# Patient Record
Sex: FEMALE | Race: WHITE | NOT HISPANIC OR LATINO
[De-identification: names, ages, dates, MRNs, and addresses within clinical notes are randomized per-mention and may not be internally consistent; named-entity substitution may affect disease eponyms.]

---

## 2018-05-09 ENCOUNTER — RESULT REVIEW (OUTPATIENT)
Age: 73
End: 2018-05-09

## 2019-09-26 ENCOUNTER — APPOINTMENT (OUTPATIENT)
Dept: PSYCHIATRY | Facility: CLINIC | Age: 74
End: 2019-09-26
Payer: MEDICARE

## 2019-09-26 DIAGNOSIS — M19.90 UNSPECIFIED OSTEOARTHRITIS, UNSPECIFIED SITE: ICD-10-CM

## 2019-09-26 DIAGNOSIS — J45.909 UNSPECIFIED ASTHMA, UNCOMPLICATED: ICD-10-CM

## 2019-09-26 DIAGNOSIS — M26.609 UNSPECIFIED TEMPOROMANDIBULAR JOINT DISORDER: ICD-10-CM

## 2019-09-26 DIAGNOSIS — H91.90 UNSPECIFIED HEARING LOSS, UNSPECIFIED EAR: ICD-10-CM

## 2019-09-26 DIAGNOSIS — M79.7 FIBROMYALGIA: ICD-10-CM

## 2019-09-26 PROCEDURE — 90792 PSYCH DIAG EVAL W/MED SRVCS: CPT

## 2019-09-26 RX ORDER — NAPROXEN 375 MG/1
375 TABLET ORAL
Refills: 0 | Status: ACTIVE | COMMUNITY
Start: 2019-09-26

## 2019-10-01 ENCOUNTER — APPOINTMENT (OUTPATIENT)
Dept: PSYCHIATRY | Facility: CLINIC | Age: 74
End: 2019-10-01
Payer: MEDICARE

## 2019-10-01 PROCEDURE — 99215 OFFICE O/P EST HI 40 MIN: CPT

## 2019-10-07 ENCOUNTER — RX RENEWAL (OUTPATIENT)
Age: 74
End: 2019-10-07

## 2019-10-07 ENCOUNTER — OTHER (OUTPATIENT)
Age: 74
End: 2019-10-07

## 2019-10-07 RX ORDER — DULOXETINE HYDROCHLORIDE 20 MG/1
20 CAPSULE, DELAYED RELEASE PELLETS ORAL DAILY
Refills: 0 | Status: DISCONTINUED | COMMUNITY
Start: 2019-09-26 | End: 2019-10-07

## 2019-10-14 ENCOUNTER — RX RENEWAL (OUTPATIENT)
Age: 74
End: 2019-10-14

## 2019-10-15 ENCOUNTER — APPOINTMENT (OUTPATIENT)
Dept: PSYCHIATRY | Facility: CLINIC | Age: 74
End: 2019-10-15
Payer: MEDICARE

## 2019-10-15 PROCEDURE — 99214 OFFICE O/P EST MOD 30 MIN: CPT

## 2019-10-24 ENCOUNTER — APPOINTMENT (OUTPATIENT)
Dept: PSYCHIATRY | Facility: CLINIC | Age: 74
End: 2019-10-24
Payer: MEDICARE

## 2019-10-24 PROCEDURE — 99214 OFFICE O/P EST MOD 30 MIN: CPT

## 2019-11-14 ENCOUNTER — APPOINTMENT (OUTPATIENT)
Dept: PSYCHIATRY | Facility: CLINIC | Age: 74
End: 2019-11-14

## 2019-11-20 ENCOUNTER — APPOINTMENT (OUTPATIENT)
Dept: PSYCHIATRY | Facility: CLINIC | Age: 74
End: 2019-11-20
Payer: MEDICARE

## 2019-11-20 PROCEDURE — 99214 OFFICE O/P EST MOD 30 MIN: CPT

## 2019-12-18 ENCOUNTER — APPOINTMENT (OUTPATIENT)
Dept: PSYCHIATRY | Facility: CLINIC | Age: 74
End: 2019-12-18
Payer: MEDICARE

## 2019-12-18 PROCEDURE — 99214 OFFICE O/P EST MOD 30 MIN: CPT

## 2019-12-18 RX ORDER — QUETIAPINE FUMARATE 25 MG/1
25 TABLET ORAL
Qty: 30 | Refills: 1 | Status: DISCONTINUED | COMMUNITY
Start: 2019-10-14 | End: 2019-12-18

## 2019-12-19 ENCOUNTER — RX RENEWAL (OUTPATIENT)
Age: 74
End: 2019-12-19

## 2020-01-09 ENCOUNTER — RX RENEWAL (OUTPATIENT)
Age: 75
End: 2020-01-09

## 2020-01-27 RX ORDER — TRAZODONE HYDROCHLORIDE 50 MG/1
50 TABLET ORAL
Qty: 30 | Refills: 0 | Status: DISCONTINUED | COMMUNITY
Start: 2019-12-18 | End: 2020-01-27

## 2020-02-06 ENCOUNTER — APPOINTMENT (OUTPATIENT)
Dept: PSYCHIATRY | Facility: CLINIC | Age: 75
End: 2020-02-06
Payer: MEDICARE

## 2020-02-06 DIAGNOSIS — E78.5 HYPERLIPIDEMIA, UNSPECIFIED: ICD-10-CM

## 2020-02-06 DIAGNOSIS — K21.9 GASTRO-ESOPHAGEAL REFLUX DISEASE W/OUT ESOPHAGITIS: ICD-10-CM

## 2020-02-06 PROCEDURE — 99214 OFFICE O/P EST MOD 30 MIN: CPT

## 2020-02-06 RX ORDER — OMEPRAZOLE 20 MG/1
20 CAPSULE, DELAYED RELEASE ORAL
Refills: 0 | Status: ACTIVE | COMMUNITY
Start: 2019-09-26

## 2020-02-06 RX ORDER — FOLIC ACID/VIT B COMPLEX AND C 400 MCG
TABLET ORAL
Refills: 0 | Status: ACTIVE | COMMUNITY
Start: 2019-09-26

## 2020-02-06 RX ORDER — DENOSUMAB 60 MG/ML
60 INJECTION SUBCUTANEOUS
Refills: 0 | Status: ACTIVE | COMMUNITY
Start: 2019-09-26

## 2020-02-06 RX ORDER — PITAVASTATIN CALCIUM 2.09 MG/1
2 TABLET, FILM COATED ORAL
Refills: 0 | Status: ACTIVE | COMMUNITY
Start: 2019-09-26

## 2020-02-06 RX ORDER — QUETIAPINE FUMARATE 25 MG/1
25 TABLET ORAL
Qty: 30 | Refills: 1 | Status: DISCONTINUED | COMMUNITY
Start: 2020-01-27 | End: 2020-02-06

## 2020-02-06 RX ORDER — CHROMIUM 200 MCG
1000 TABLET ORAL
Refills: 0 | Status: ACTIVE | COMMUNITY
Start: 2019-09-26

## 2020-02-06 RX ORDER — GABAPENTIN 100 MG/1
100 CAPSULE ORAL TWICE DAILY
Refills: 0 | Status: DISCONTINUED | COMMUNITY
Start: 2019-12-18 | End: 2020-02-06

## 2020-03-06 ENCOUNTER — APPOINTMENT (OUTPATIENT)
Dept: PSYCHIATRY | Facility: CLINIC | Age: 75
End: 2020-03-06

## 2020-03-13 ENCOUNTER — APPOINTMENT (OUTPATIENT)
Dept: PSYCHIATRY | Facility: CLINIC | Age: 75
End: 2020-03-13
Payer: MEDICARE

## 2020-03-13 PROCEDURE — 99214 OFFICE O/P EST MOD 30 MIN: CPT

## 2020-04-20 ENCOUNTER — TRANSCRIPTION ENCOUNTER (OUTPATIENT)
Age: 75
End: 2020-04-20

## 2020-04-20 ENCOUNTER — APPOINTMENT (OUTPATIENT)
Dept: PSYCHIATRY | Facility: CLINIC | Age: 75
End: 2020-04-20

## 2020-05-29 ENCOUNTER — APPOINTMENT (OUTPATIENT)
Dept: PSYCHIATRY | Facility: CLINIC | Age: 75
End: 2020-05-29
Payer: MEDICARE

## 2020-05-29 PROCEDURE — 99443: CPT | Mod: 95

## 2020-05-29 RX ORDER — ALBUTEROL SULFATE 90 UG/1
108 (90 BASE) AEROSOL, METERED RESPIRATORY (INHALATION)
Refills: 0 | Status: DISCONTINUED | COMMUNITY
Start: 2019-09-26 | End: 2020-05-29

## 2020-05-29 RX ORDER — FLUTICASONE PROPIONATE 100 UG/1
100 POWDER, METERED RESPIRATORY (INHALATION)
Refills: 0 | Status: DISCONTINUED | COMMUNITY
Start: 2019-09-26 | End: 2020-05-29

## 2020-05-29 RX ORDER — FLUTICASONE FUROATE, UMECLIDINIUM BROMIDE AND VILANTEROL TRIFENATATE 100; 62.5; 25 UG/1; UG/1; UG/1
100-62.5-25 POWDER RESPIRATORY (INHALATION)
Refills: 0 | Status: ACTIVE | COMMUNITY
Start: 2020-05-29

## 2020-05-29 RX ORDER — AZELASTINE HYDROCHLORIDE 137 UG/1
0.1 SPRAY, METERED NASAL
Refills: 0 | Status: ACTIVE | COMMUNITY
Start: 2020-05-29

## 2020-05-29 RX ORDER — ALBUTEROL SULFATE 90 UG/1
108 (90 BASE) INHALANT RESPIRATORY (INHALATION)
Refills: 0 | Status: ACTIVE | COMMUNITY
Start: 2020-05-29

## 2020-06-29 ENCOUNTER — APPOINTMENT (OUTPATIENT)
Dept: PSYCHIATRY | Facility: CLINIC | Age: 75
End: 2020-06-29
Payer: MEDICARE

## 2020-06-29 PROCEDURE — 99214 OFFICE O/P EST MOD 30 MIN: CPT

## 2020-07-15 ENCOUNTER — APPOINTMENT (OUTPATIENT)
Dept: PSYCHIATRY | Facility: CLINIC | Age: 75
End: 2020-07-15
Payer: MEDICARE

## 2020-07-15 PROCEDURE — 99214 OFFICE O/P EST MOD 30 MIN: CPT

## 2020-08-24 ENCOUNTER — APPOINTMENT (OUTPATIENT)
Dept: PSYCHIATRY | Facility: CLINIC | Age: 75
End: 2020-08-24
Payer: MEDICARE

## 2020-08-24 ENCOUNTER — RX RENEWAL (OUTPATIENT)
Age: 75
End: 2020-08-24

## 2020-08-24 DIAGNOSIS — H35.3190 NONEXUDATIVE AGE-RELATED MACULAR DEGENERATION, UNSPECIFIED EYE, STAGE UNSPECIFIED: ICD-10-CM

## 2020-08-24 DIAGNOSIS — H35.3131 NONEXUDATIVE AGE-RELATED MACULAR DEGENERATION, BILATERAL, EARLY DRY STAGE: ICD-10-CM

## 2020-08-24 PROCEDURE — 99214 OFFICE O/P EST MOD 30 MIN: CPT

## 2020-08-24 RX ORDER — GLUCOSAMINE HCL/CHONDROITIN SU 500-400 MG
3 CAPSULE ORAL AT BEDTIME
Refills: 0 | Status: ACTIVE | COMMUNITY
Start: 2019-10-01

## 2020-08-24 RX ORDER — FLUOROMETHOLONE ACETATE 1 MG/ML
0.1 SUSPENSION/ DROPS OPHTHALMIC TWICE DAILY
Refills: 0 | Status: ACTIVE | COMMUNITY
Start: 2020-08-24

## 2020-08-31 PROBLEM — H35.3190 MACULAR DEGENERATION, DRY: Status: ACTIVE | Noted: 2020-08-24

## 2020-08-31 PROBLEM — H35.3131 EARLY DRY STAGE NONEXUDATIVE AGE-RELATED MACULAR DEGENERATION OF BOTH EYES: Status: ACTIVE | Noted: 2020-08-24

## 2020-09-28 ENCOUNTER — APPOINTMENT (OUTPATIENT)
Dept: PSYCHIATRY | Facility: CLINIC | Age: 75
End: 2020-09-28
Payer: MEDICARE

## 2020-09-28 PROCEDURE — 99214 OFFICE O/P EST MOD 30 MIN: CPT

## 2020-11-10 ENCOUNTER — APPOINTMENT (OUTPATIENT)
Dept: PSYCHIATRY | Facility: CLINIC | Age: 75
End: 2020-11-10
Payer: MEDICARE

## 2020-11-10 PROCEDURE — 99214 OFFICE O/P EST MOD 30 MIN: CPT

## 2020-11-19 ENCOUNTER — RX RENEWAL (OUTPATIENT)
Age: 75
End: 2020-11-19

## 2020-12-03 ENCOUNTER — RX RENEWAL (OUTPATIENT)
Age: 75
End: 2020-12-03

## 2020-12-21 ENCOUNTER — APPOINTMENT (OUTPATIENT)
Dept: PSYCHIATRY | Facility: CLINIC | Age: 75
End: 2020-12-21

## 2021-01-05 ENCOUNTER — APPOINTMENT (OUTPATIENT)
Dept: PSYCHIATRY | Facility: CLINIC | Age: 76
End: 2021-01-05
Payer: MEDICARE

## 2021-01-05 PROCEDURE — 99214 OFFICE O/P EST MOD 30 MIN: CPT

## 2021-02-16 ENCOUNTER — RX RENEWAL (OUTPATIENT)
Age: 76
End: 2021-02-16

## 2021-02-17 ENCOUNTER — APPOINTMENT (OUTPATIENT)
Dept: PSYCHIATRY | Facility: CLINIC | Age: 76
End: 2021-02-17
Payer: MEDICARE

## 2021-02-17 PROCEDURE — 99213 OFFICE O/P EST LOW 20 MIN: CPT | Mod: 95

## 2021-04-23 ENCOUNTER — APPOINTMENT (OUTPATIENT)
Dept: PSYCHIATRY | Facility: CLINIC | Age: 76
End: 2021-04-23
Payer: MEDICARE

## 2021-04-23 PROCEDURE — 99214 OFFICE O/P EST MOD 30 MIN: CPT | Mod: 95

## 2021-05-27 ENCOUNTER — APPOINTMENT (OUTPATIENT)
Dept: PSYCHIATRY | Facility: CLINIC | Age: 76
End: 2021-05-27
Payer: MEDICARE

## 2021-05-27 PROCEDURE — 99214 OFFICE O/P EST MOD 30 MIN: CPT

## 2021-07-08 ENCOUNTER — APPOINTMENT (OUTPATIENT)
Dept: PSYCHIATRY | Facility: CLINIC | Age: 76
End: 2021-07-08
Payer: MEDICARE

## 2021-07-08 DIAGNOSIS — Z86.59 PERSONAL HISTORY OF OTHER MENTAL AND BEHAVIORAL DISORDERS: ICD-10-CM

## 2021-07-08 PROCEDURE — 99214 OFFICE O/P EST MOD 30 MIN: CPT

## 2021-07-08 RX ORDER — QUETIAPINE FUMARATE 25 MG/1
25 TABLET ORAL AT BEDTIME
Qty: 15 | Refills: 1 | Status: DISCONTINUED | COMMUNITY
Start: 2021-04-23 | End: 2021-07-08

## 2021-07-09 PROBLEM — Z86.59 HISTORY OF EATING DISORDER: Status: ACTIVE | Noted: 2019-10-01

## 2021-09-09 ENCOUNTER — APPOINTMENT (OUTPATIENT)
Dept: PSYCHIATRY | Facility: CLINIC | Age: 76
End: 2021-09-09
Payer: MEDICARE

## 2021-09-09 PROCEDURE — 99214 OFFICE O/P EST MOD 30 MIN: CPT | Mod: 95

## 2021-10-14 ENCOUNTER — APPOINTMENT (OUTPATIENT)
Dept: PSYCHIATRY | Facility: CLINIC | Age: 76
End: 2021-10-14
Payer: MEDICARE

## 2021-10-14 PROCEDURE — 99214 OFFICE O/P EST MOD 30 MIN: CPT

## 2021-11-12 ENCOUNTER — NON-APPOINTMENT (OUTPATIENT)
Age: 76
End: 2021-11-12

## 2021-11-12 ENCOUNTER — APPOINTMENT (OUTPATIENT)
Dept: NEUROLOGY | Facility: CLINIC | Age: 76
End: 2021-11-12
Payer: MEDICARE

## 2021-11-12 VITALS
WEIGHT: 150 LBS | SYSTOLIC BLOOD PRESSURE: 124 MMHG | BODY MASS INDEX: 25.61 KG/M2 | DIASTOLIC BLOOD PRESSURE: 76 MMHG | HEIGHT: 64 IN

## 2021-11-12 DIAGNOSIS — R90.89 OTHER ABNORMAL FINDINGS ON DIAGNOSTIC IMAGING OF CENTRAL NERVOUS SYSTEM: ICD-10-CM

## 2021-11-12 DIAGNOSIS — Z78.9 OTHER SPECIFIED HEALTH STATUS: ICD-10-CM

## 2021-11-12 DIAGNOSIS — G31.84 MILD COGNITIVE IMPAIRMENT, SO STATED: ICD-10-CM

## 2021-11-12 PROCEDURE — 99205 OFFICE O/P NEW HI 60 MIN: CPT

## 2021-11-12 NOTE — ASSESSMENT
[FreeTextEntry1] : Impression: This patient's presentation is consistent with mild cognitive impairment.  She has abnormal brain MRI with nonspecific white matter high intensity foci of questionable clinical significance.  There is been no progression on serial MRIs.  She does not present as dementia.  There are significant psychiatric comorbidities including depression anxiety and PTSD and chronic pain..  Suspect there may be a component of pseudodementia due to her underlying psychiatric condition.\par \par Recommendations: Defer further neurological work-up at this juncture.  Consider referral for formal neuropsychological testing.  Vascular risk factor reduction regarding the brain MRI findings.  Patient states she is intolerant of aspirin and statin medication.  Medical follow-up in this regard is suggested.\par

## 2021-11-12 NOTE — CONSULT LETTER
[Dear  ___] : Dear  [unfilled], [Consult Letter:] : I had the pleasure of evaluating your patient, [unfilled]. [Please see my note below.] : Please see my note below. [Consult Closing:] : Thank you very much for allowing me to participate in the care of this patient.  If you have any questions, please do not hesitate to contact me. [Sincerely,] : Sincerely, [FreeTextEntry3] : Severo Moreland MD\par

## 2021-11-12 NOTE — PHYSICAL EXAM
[FreeTextEntry1] : Head:  Normocephalic Neck: Supple nontender no carotid bruits. \par \par Mental Status:  Alert Oriented X3 Speech normal and no aphasia or dysarthria.  This patient scores 30/30 on the MoCA cognitive assessment.  She recalls 4 out of 5 words.  She does not appear depressed.\par \par Cranial Nerves:  PERRL, Fundi normal Visual Fields full  EOMI no diplopia no ptosis no nystagmus, V through XII intact.\par \par Motor:  No drift, normal strength tone and coordination and no focal atrophy. No abnormal movements. No dysmetria.  Normal rapid alternating movements. \par \par DTRs: Symmetric and 2+.  Plantars flexor.  No Clonus.\par \par Sensory:  Normal testing with pin light touch and vibration and  Joint position sense.  Normal DSS to touch.\par \par Gait: Regular mildly unsteady tandem walking negative Romberg.\par
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
## 2021-12-28 ENCOUNTER — RX RENEWAL (OUTPATIENT)
Age: 76
End: 2021-12-28

## 2022-01-12 ENCOUNTER — RX RENEWAL (OUTPATIENT)
Age: 77
End: 2022-01-12

## 2022-01-14 ENCOUNTER — APPOINTMENT (OUTPATIENT)
Dept: PSYCHIATRY | Facility: CLINIC | Age: 77
End: 2022-01-14
Payer: MEDICARE

## 2022-01-14 DIAGNOSIS — G47.00 INSOMNIA, UNSPECIFIED: ICD-10-CM

## 2022-01-14 PROCEDURE — 99214 OFFICE O/P EST MOD 30 MIN: CPT | Mod: 95

## 2022-01-24 ENCOUNTER — RX RENEWAL (OUTPATIENT)
Age: 77
End: 2022-01-24

## 2022-02-18 ENCOUNTER — APPOINTMENT (OUTPATIENT)
Dept: PSYCHIATRY | Facility: CLINIC | Age: 77
End: 2022-02-18
Payer: MEDICARE

## 2022-02-18 PROCEDURE — 99214 OFFICE O/P EST MOD 30 MIN: CPT | Mod: 95

## 2022-03-21 ENCOUNTER — EMERGENCY (EMERGENCY)
Facility: HOSPITAL | Age: 77
LOS: 1 days | End: 2022-03-21
Attending: EMERGENCY MEDICINE | Admitting: EMERGENCY MEDICINE
Payer: MEDICARE

## 2022-03-21 VITALS
SYSTOLIC BLOOD PRESSURE: 149 MMHG | HEIGHT: 64 IN | TEMPERATURE: 98 F | OXYGEN SATURATION: 99 % | RESPIRATION RATE: 14 BRPM | HEART RATE: 84 BPM | WEIGHT: 293 LBS | DIASTOLIC BLOOD PRESSURE: 89 MMHG

## 2022-03-21 VITALS
TEMPERATURE: 98 F | SYSTOLIC BLOOD PRESSURE: 151 MMHG | DIASTOLIC BLOOD PRESSURE: 85 MMHG | RESPIRATION RATE: 18 BRPM | OXYGEN SATURATION: 99 % | HEART RATE: 82 BPM

## 2022-03-21 PROCEDURE — 73562 X-RAY EXAM OF KNEE 3: CPT

## 2022-03-21 PROCEDURE — 72125 CT NECK SPINE W/O DYE: CPT | Mod: MA

## 2022-03-21 PROCEDURE — 70450 CT HEAD/BRAIN W/O DYE: CPT | Mod: MA

## 2022-03-21 PROCEDURE — 72131 CT LUMBAR SPINE W/O DYE: CPT | Mod: 26,MA

## 2022-03-21 PROCEDURE — 73562 X-RAY EXAM OF KNEE 3: CPT | Mod: 26,RT

## 2022-03-21 PROCEDURE — 72131 CT LUMBAR SPINE W/O DYE: CPT | Mod: MA

## 2022-03-21 PROCEDURE — 72125 CT NECK SPINE W/O DYE: CPT | Mod: 26,MA

## 2022-03-21 PROCEDURE — 73080 X-RAY EXAM OF ELBOW: CPT | Mod: 26,RT

## 2022-03-21 PROCEDURE — 70450 CT HEAD/BRAIN W/O DYE: CPT | Mod: 26,MA

## 2022-03-21 PROCEDURE — 99284 EMERGENCY DEPT VISIT MOD MDM: CPT

## 2022-03-21 PROCEDURE — 99284 EMERGENCY DEPT VISIT MOD MDM: CPT | Mod: 25

## 2022-03-21 PROCEDURE — 73080 X-RAY EXAM OF ELBOW: CPT

## 2022-03-21 RX ORDER — BUPROPION HYDROCHLORIDE 150 MG/1
0 TABLET, EXTENDED RELEASE ORAL
Qty: 0 | Refills: 0 | DISCHARGE

## 2022-03-21 RX ORDER — IBUPROFEN 200 MG
400 TABLET ORAL ONCE
Refills: 0 | Status: COMPLETED | OUTPATIENT
Start: 2022-03-21 | End: 2022-03-21

## 2022-03-21 RX ADMIN — Medication 400 MILLIGRAM(S): at 18:53

## 2022-03-21 RX ADMIN — Medication 400 MILLIGRAM(S): at 19:14

## 2022-03-21 NOTE — ED PROVIDER NOTE - TOBACCO USE
Former smoker unclear if pt report related to medication or, more likely, related to the pt's severe ongoing anxiety and somatic preoccupation unclear if pt report related to medication or, more likely, related to the pt's severe ongoing anxiety and somatic preoccupation unclear if pt report related to medication or, more likely, related to the pt's severe ongoing anxiety and somatic preoccupation unclear if pt report related to medication or, more likely, related to the pt's severe ongoing anxiety and somatic preoccupation unclear if pt report related to medication or, more likely, related to the pt's severe ongoing anxiety and somatic preoccupation unclear if pt report related to medication or, more likely, related to the pt's severe ongoing anxiety and somatic preoccupation unclear if pt report related to medication or, more likely, related to the pt's severe ongoing anxiety and somatic preoccupation unclear if pt report related to medication or, more likely, related to the pt's severe ongoing anxiety and somatic preoccupation unclear if pt report related to medication or, more likely, related to the pt's severe ongoing anxiety and somatic preoccupation unclear if pt report related to medication or, more likely, related to the pt's severe ongoing anxiety and somatic preoccupation unclear if pt report related to medication or, more likely, related to the pt's severe ongoing anxiety and somatic preoccupation unclear if pt report related to medication or, more likely, related to the pt's severe ongoing anxiety and somatic preoccupation unclear if pt report related to medication or, more likely, related to the pt's severe ongoing anxiety and somatic preoccupation unclear if pt report related to medication or, more likely, related to the pt's severe ongoing anxiety and somatic preoccupation

## 2022-03-21 NOTE — ED ADULT NURSE REASSESSMENT NOTE - NS ED NURSE REASSESS COMMENT FT1
pt ambulated in the hallway, reevaluated by MD, DIPTI, right arm sling placed per verbal MD order, pt discharged home with f/u instructions.

## 2022-03-21 NOTE — ED PROVIDER NOTE - PROGRESS NOTE DETAILS
Reevaluated patient at bedside.  Patient feeling improved, ambulating without assistance.  Discussed the results of all diagnostic testing in ED and copies of available reports given.  Patient given right elbow sling.  An opportunity to ask questions was given.  Discussed the importance of prompt, close medical follow-up.  Patient will return with any changes, concerns or persistent / worsening symptoms.  Understanding of all instructions verbalized.

## 2022-03-21 NOTE — ED PROVIDER NOTE - CARE PLAN
Principal Discharge DX:	Injury of head and neck  Secondary Diagnosis:	Injury of right elbow  Secondary Diagnosis:	Pain of knee after injury  Secondary Diagnosis:	Back pain   1

## 2022-03-21 NOTE — ED PROVIDER NOTE - CONSTITUTIONAL, MLM
Pharmacy is calling regarding metoprolol and carvedilol prescriptions. Writer advised caller of a callback from the nurse within 24-72 hours.     Patient Name: Tang Hester  Callback Number: [phone]  Fax Number: [phone]   Additional Info: Pharmacy is questioning if the patient should be taking the metoprolol and the carvedilol together. Please give a call to clarify.  Did you confirm the message with the caller?: yes    Thank you,  Toña Vila     normal... Well appearing, awake, alert, oriented to person, place, time/situation and in no apparent distress.

## 2022-03-21 NOTE — ED PROVIDER NOTE - OBJECTIVE STATEMENT
77 y/o F presents to ED c/o head pain, neck pain, low back pain, R elbow pain, R knee pain s/p slip and fall. Pt relates she missed a step fell backwards hitting head, neck and back and fell down a few steps. No LOC, chest pain, abd pain, dizziness, n/v, weakness, numbness, incontinence. Pt declining pain medication PCP: Dr. Oglesby

## 2022-03-21 NOTE — ED ADULT NURSE NOTE - NS_ED_NURSE_TEACHING_TOPIC_ED_A_ED
Called [facility]- spoke with Megan Fontaine- I advised her I was calling to initiate a authorizaiton for Botox   I provided her with the following information:    Botox 200 units   Every 3 months  Provider: Satish Magana  Diagnosis: N12 425  Procedure codes: 39069,   Specialty Pharmacy: [address]  Start date: 3/26/2020  Tracking #: [id number] head injury/Medications

## 2022-03-21 NOTE — ED PROVIDER NOTE - CARE PROVIDER_API CALL
Ernesto Mcknight  ORTHOPAEDIC SURGERY  [address] [place] [address]  Phone: [phone]  Fax: [phone]  Follow Up Time: 1-3 Days

## 2022-03-21 NOTE — ED ADULT NURSE NOTE - INTERVENTIONS DEFINITIONS
[place] to call system/Call bell, personal items and telephone within reach/Instruct patient to call for assistance/Stretcher in lowest position, wheels locked, appropriate side rails in place

## 2022-03-25 ENCOUNTER — EMERGENCY (EMERGENCY)
Facility: HOSPITAL | Age: 77
LOS: 1 days | Discharge: HOME | End: 2022-03-25
Attending: EMERGENCY MEDICINE | Admitting: EMERGENCY MEDICINE
Payer: MEDICARE

## 2022-03-25 VITALS
TEMPERATURE: 97 F | RESPIRATION RATE: 18 BRPM | DIASTOLIC BLOOD PRESSURE: 78 MMHG | WEIGHT: 149.91 LBS | HEIGHT: 64 IN | HEART RATE: 86 BPM | SYSTOLIC BLOOD PRESSURE: 122 MMHG | OXYGEN SATURATION: 96 %

## 2022-03-25 VITALS
OXYGEN SATURATION: 97 % | TEMPERATURE: 98 F | DIASTOLIC BLOOD PRESSURE: 76 MMHG | HEART RATE: 80 BPM | SYSTOLIC BLOOD PRESSURE: 128 MMHG | RESPIRATION RATE: 17 BRPM

## 2022-03-25 PROCEDURE — 72128 CT CHEST SPINE W/O DYE: CPT | Mod: MA

## 2022-03-25 PROCEDURE — 72128 CT CHEST SPINE W/O DYE: CPT | Mod: 26,MA

## 2022-03-25 PROCEDURE — 99284 EMERGENCY DEPT VISIT MOD MDM: CPT | Mod: FS

## 2022-03-25 PROCEDURE — 99284 EMERGENCY DEPT VISIT MOD MDM: CPT | Mod: 25
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
## 2022-03-25 NOTE — ED PROVIDER NOTE - CARE PROVIDER_API CALL
Ernesto Mcknight  ORTHOPAEDIC SURGERY  [address] [place] [address]  Phone: [phone]  Fax: [phone]  Follow Up Time: 1-3 Days

## 2022-03-25 NOTE — ED PROVIDER NOTE - NSFOLLOWUPINSTRUCTIONS_ED_ALL_ED_FT
ice or moist heat  SalanPas lidocaine patches over the counter, 12 hours on 12 hours off  follow up with orthopedics and neurology       Thoracic Strain      A thoracic strain is an injury to the muscles or tendons that attach to the upper back. Tendons are tissues that connect muscle to bone. This injury is sometimes called a mid-back strain.    A strain can be mild or very bad. A mild strain may take only 1–2 weeks to heal. A very bad strain involves torn muscles or tendons, so it may take 6–8 weeks to heal.      What are the causes?    This condition may be caused by:  •A fall or a hit to the body.      •Twisting or stretching the back too far. This may happen when doing activities that require a lot of energy, such as lifting heavy objects.      In some cases, the cause may not be known.      What increases the risk?    This injury is more common in:  •Athletes.       •People who are very overweight (obese).        What are the signs or symptoms?    •Pain in the middle back, especially with movement. This is the main symptom.      •Stiffness or limited range of motion.       •Sudden muscle tightening (spasms).        How is this treated?    This condition may be treated with:  •Resting the injured area.       •Putting heat and cold on the injured area.      •Medicines for pain and inflammation, such as NSAIDs.       •Prescription medicine for pain or to relax the muscles. These may be used for a short time if needed.      •Physical therapy. This will involve doing exercises to stretch and strengthen the middle back.        Follow these instructions at home:      Managing pain, stiffness, and swelling                 •If told, put ice on the injured area.  •Put ice in a plastic bag.      •Place a towel between your skin and the bag.      •Leave the ice on for 20 minutes, 2–3 times a day.      •If told, put heat on the affected area. Do this as often as told by your doctor. Use the heat source that your doctor recommends, such as a moist heat pack or a heating pad.  •Place a towel between your skin and the heat source.       •Leave the heat on for 20–30 minutes.       •Remove the heat if your skin turns bright red. This is very important if you are unable to feel pain, heat, or cold. You may have a greater risk of getting burned.        Activity     •Rest and return to your normal activities as told by your doctor. Ask your doctor what activities are safe for you.      •Do exercises as told by your doctor.      Medicines     •Take over-the-counter and prescription medicines only as told by your doctor.    •Ask your doctor if the medicine prescribed to you:  •Requires you to avoid driving or using heavy machinery.     •Can cause trouble pooping (constipation). You may need to take steps to prevent or treat trouble pooping:   •Drink enough fluid to keep your pee (urine) pale yellow.      •Take over-the-counter or prescription medicines.      •Eat foods that are high in fiber. These include beans, whole grains, and fresh fruits and vegetables.       •Limit foods that are high in fat and processed sugars. These include fried or sweet foods.            Injury prevention      To prevent a future mid-back injury:  •Always warm up before physical activity or sports.      •Cool down and stretch after being active.       •Use correct form when playing sports and lifting heavy objects. Bend your knees before you lift heavy objects.      •Use good posture when sitting and standing.     •Stay physically fit and maintain a healthy weight.   •Do at least 150 minutes of moderate-intensity exercise each week, such as brisk walking or water aerobics.      •Do strength exercises at least 2 times each week.        General instructions     • Do not use any products that contain nicotine or tobacco. These products include cigarettes, e-cigarettes, and chewing tobacco. If you need help quitting, ask your doctor.      •Keep all follow-up visits as told by your doctor. This is important.        Contact a doctor if:    •Your pain is not helped by medicine.      •Your pain or stiffness is getting worse.      •You have pain or stiffness in your neck or lower back.        Get help right away if you:    •Have shortness of breath.      •Have chest pain.      •Have weakness or loss of feeling (numbness) in your legs or arms.      •Cannot control when you pee (urinate).        Summary    •A thoracic strain is an injury to the muscles or tendons that attach to the upper back.      •If told, put ice or heat on the affected area.      •Rest and return to your normal activities as told by your doctor.      •Keep all follow-up visits as told by your doctor.      This information is not intended to replace advice given to you by your health care provider. Make sure you discuss any questions you have with your health care provider.

## 2022-03-25 NOTE — ED PROVIDER NOTE - PROGRESS NOTE DETAILS
Reevaluated patient at bedside.  resting comfortably. declines pain meds.  Discussed the results of all diagnostic testing in ED and copies of all reports given. will follow up with neuro and ortho as previously prescribed.    An opportunity to ask questions was given.  Discussed the importance of prompt, close medical follow-up.  Patient will return with any changes, concerns or persistent / worsening symptoms.  Understanding of all instructions verbalized.

## 2022-03-25 NOTE — ED PROVIDER NOTE - NSICDXPASTMEDICALHX_GEN_ALL_CORE_FT
PAST MEDICAL HISTORY:  Anxiety and depression     Asthma     COPD, moderate     Fibromyalgia     Myofascial pain syndrome

## 2022-03-25 NOTE — ED ADULT NURSE NOTE - INTERVENTIONS DEFINITIONS
Call bell, personal items and telephone within reach/Instruct patient to call for assistance/Room bathroom lighting operational/Physically safe environment: no spills, clutter or unnecessary equipment/Stretcher in lowest position, wheels locked, appropriate side rails in place/Monitor gait and stability/Monitor for mental status changes and reorient to person, place, and time/Review medications for side effects contributing to fall risk/Reinforce activity limits and safety measures with patient and family

## 2022-03-25 NOTE — ED PROVIDER NOTE - MUSCULOSKELETAL, MLM
diffuse soft tissue tenderness to mid thoracic region. no step off deformities. full ROM of all ext. ambulatory with steady gait

## 2022-03-25 NOTE — ED PROVIDER NOTE - OBJECTIVE STATEMENT
76 year old female with history of asthma, fibromyalgia, anxiety, depression, COPD, and myofacial pain syndrome presents for imaging for thoracic spine after fall on 3/21. patient missed a stop and fell backwards. hit head and had diffuse body aches. no blood thinners. was seen at this ED and had CT of head, cervical spine, lumbar spine, and ext x-rays (negative per patient for acute fx). declines pain meds. has been taking motrin over the counter. was seen by ortho yesterday and has follow up scheduled in 2 weeks. was seen by neuro today and reports having mid back pain with palpation during exam. was recommended to come back to ED to have imaging of thoracic spine to eval for compression fractures, since imaging was not performed on last visit. pain moderate in nature   PCP Denzel Lopez   Neuro Jean Marie   ortho Roxanna  pain management Tesha

## 2022-03-25 NOTE — ED PROVIDER NOTE - PATIENT PORTAL LINK FT
You can access the FollowMyHealth Patient Portal offered by [facility] by registering at the following website: [email or web address]. By joining [facility]’s FollowMyHealth portal, you will also be able to view your health information using other applications (apps) compatible with our system.

## 2022-03-25 NOTE — ED PROVIDER NOTE - NS ED ATTENDING STATEMENT MOD
This was a shared visit with the ANNA. I reviewed and verified the documentation and independently performed the documented:

## 2022-03-25 NOTE — ED ADULT NURSE REASSESSMENT NOTE - NS ED NURSE REASSESS COMMENT FT1
pt re evaluated by md and to be d'c/d  pt discharged stable and ambulatory in nad at present d/c instruction reinforced and pt verbalized understanding vital signs as charted  pt advised to follow up ortho

## 2022-04-05 ENCOUNTER — APPOINTMENT (OUTPATIENT)
Dept: PSYCHIATRY | Facility: CLINIC | Age: 77
End: 2022-04-05

## 2022-04-25 PROBLEM — J44.9 CHRONIC OBSTRUCTIVE PULMONARY DISEASE, UNSPECIFIED: Chronic | Status: ACTIVE | Noted: 2022-03-25

## 2022-04-25 PROBLEM — M79.18 MYALGIA, OTHER SITE: Chronic | Status: ACTIVE | Noted: 2022-03-25

## 2022-04-25 PROBLEM — J45.909 UNSPECIFIED ASTHMA, UNCOMPLICATED: Chronic | Status: ACTIVE | Noted: 2022-03-25

## 2022-04-25 PROBLEM — F41.9 ANXIETY DISORDER, UNSPECIFIED: Chronic | Status: ACTIVE | Noted: 2022-03-25

## 2022-04-25 PROBLEM — M79.7 FIBROMYALGIA: Chronic | Status: ACTIVE | Noted: 2022-03-25

## 2022-05-10 ENCOUNTER — RX RENEWAL (OUTPATIENT)
Age: 77
End: 2022-05-10

## 2022-05-12 ENCOUNTER — APPOINTMENT (OUTPATIENT)
Dept: NEUROLOGY | Facility: CLINIC | Age: 77
End: 2022-05-12

## 2022-05-31 ENCOUNTER — APPOINTMENT (OUTPATIENT)
Dept: PSYCHIATRY | Facility: CLINIC | Age: 77
End: 2022-05-31
Payer: MEDICARE

## 2022-05-31 PROCEDURE — 99214 OFFICE O/P EST MOD 30 MIN: CPT | Mod: 95

## 2022-07-11 ENCOUNTER — APPOINTMENT (OUTPATIENT)
Dept: PSYCHIATRY | Facility: CLINIC | Age: 77
End: 2022-07-11

## 2022-09-09 ENCOUNTER — APPOINTMENT (OUTPATIENT)
Dept: PSYCHIATRY | Facility: CLINIC | Age: 77
End: 2022-09-09
Payer: MEDICARE

## 2022-09-09 PROCEDURE — 99214 OFFICE O/P EST MOD 30 MIN: CPT | Mod: 95

## 2022-09-09 RX ORDER — BUPROPION HYDROCHLORIDE 100 MG/1
100 TABLET, FILM COATED, EXTENDED RELEASE ORAL EVERY OTHER DAY
Refills: 0 | Status: DISCONTINUED | COMMUNITY
Start: 2020-06-29 | End: 2022-09-09

## 2022-09-19 ENCOUNTER — RX RENEWAL (OUTPATIENT)
Age: 77
End: 2022-09-19

## 2022-10-24 ENCOUNTER — APPOINTMENT (OUTPATIENT)
Dept: ORTHOPEDIC SURGERY | Facility: CLINIC | Age: 77
End: 2022-10-24

## 2022-10-26 ENCOUNTER — APPOINTMENT (OUTPATIENT)
Dept: ORTHOPEDIC SURGERY | Facility: CLINIC | Age: 77
End: 2022-10-26

## 2022-10-26 VITALS
HEART RATE: 86 BPM | HEIGHT: 64 IN | DIASTOLIC BLOOD PRESSURE: 80 MMHG | BODY MASS INDEX: 25.61 KG/M2 | SYSTOLIC BLOOD PRESSURE: 118 MMHG | WEIGHT: 150 LBS

## 2022-10-26 DIAGNOSIS — M47.812 SPONDYLOSIS W/OUT MYELOPATHY OR RADICULOPATHY, CERVICAL REGION: ICD-10-CM

## 2022-10-26 PROCEDURE — 99204 OFFICE O/P NEW MOD 45 MIN: CPT

## 2022-11-04 ENCOUNTER — APPOINTMENT (OUTPATIENT)
Dept: PSYCHIATRY | Facility: CLINIC | Age: 77
End: 2022-11-04

## 2022-11-04 PROCEDURE — 99214 OFFICE O/P EST MOD 30 MIN: CPT | Mod: 95

## 2022-11-04 NOTE — REASON FOR VISIT
[Home] : at home, [unfilled] , at the time of the visit. [Medical Office: ([facility])___] : at the medical office located in  [Self] : self [Patient] : Patient [This encounter was initiated by telehealth (audio with video) and converted to telephone (audio only) due to technical difficulties.] : This encounter was initiated by telehealth (audio with video) and converted to telephone (audio only) due to technical difficulties. [FreeTextEntry1] : Pt seen for management of medication.

## 2022-11-04 NOTE — PHYSICAL EXAM
[Cooperative] : cooperative [Euthymic] : euthymic [Full] : full [Clear] : clear [Linear/Goal Directed] : linear/goal directed [None] : none [None Reported] : none reported [Average] : average [WNL] : within normal limits [FreeTextEntry1] : Long silver hair.  [de-identified] : Memory and train of thought appear improved this visit, less circumstantial.

## 2022-11-04 NOTE — DISCUSSION/SUMMARY
[FreeTextEntry1] : Pt clinically stable, and due to eye disease is not driving. \par Today lost telehealth capability due to line work on her home. \par Will continue off Wellbutrin.

## 2022-11-04 NOTE — HISTORY OF PRESENT ILLNESS
[FreeTextEntry1] : Pt states she stopped Wellbutrin and her shaking stopped. \par Pt following up with cardiologist, as well as an allergist. \par Pt states she finds that she has breathing issues, she states she has environmental allergies and finds that walking outside exacerbates her breathing. Pt feels that her "brain has cleared". Pt also following up with orthopedist and pt c/o headaches and some neuropathy as well. \par Pt states her mood has been mellow. Pt has had weight gain coming off Wellbutrin.

## 2022-11-04 NOTE — REVIEW OF SYSTEMS
[Negative] : Allergic/Immunologic [FreeTextEntry3] : macular degeneration.  [FreeTextEntry9] : neuropathy [de-identified] : see interval note

## 2022-12-01 ENCOUNTER — APPOINTMENT (OUTPATIENT)
Dept: ORTHOPEDIC SURGERY | Facility: CLINIC | Age: 77
End: 2022-12-01

## 2023-01-19 ENCOUNTER — APPOINTMENT (OUTPATIENT)
Dept: ORTHOPEDIC SURGERY | Facility: CLINIC | Age: 78
End: 2023-01-19
Payer: MEDICARE

## 2023-01-19 VITALS — WEIGHT: 160 LBS | HEIGHT: 64 IN | BODY MASS INDEX: 27.31 KG/M2

## 2023-01-19 DIAGNOSIS — M22.2X1 PATELLOFEMORAL DISORDERS, RIGHT KNEE: ICD-10-CM

## 2023-01-19 PROCEDURE — 99214 OFFICE O/P EST MOD 30 MIN: CPT

## 2023-01-19 PROCEDURE — 73564 X-RAY EXAM KNEE 4 OR MORE: CPT | Mod: RT

## 2023-01-30 ENCOUNTER — APPOINTMENT (OUTPATIENT)
Dept: PSYCHIATRY | Facility: CLINIC | Age: 78
End: 2023-01-30

## 2023-02-02 ENCOUNTER — APPOINTMENT (OUTPATIENT)
Dept: ORTHOPEDIC SURGERY | Facility: CLINIC | Age: 78
End: 2023-02-02

## 2023-02-10 ENCOUNTER — APPOINTMENT (OUTPATIENT)
Dept: PSYCHIATRY | Facility: CLINIC | Age: 78
End: 2023-02-10
Payer: MEDICARE

## 2023-02-10 DIAGNOSIS — F33.9 MAJOR DEPRESSIVE DISORDER, RECURRENT, UNSPECIFIED: ICD-10-CM

## 2023-02-10 DIAGNOSIS — G31.84 MILD COGNITIVE IMPAIRMENT, SO STATED: ICD-10-CM

## 2023-02-10 PROCEDURE — 99214 OFFICE O/P EST MOD 30 MIN: CPT

## 2023-02-10 RX ORDER — VENLAFAXINE 50 MG/1
50 TABLET ORAL
Qty: 90 | Refills: 0 | Status: DISCONTINUED | COMMUNITY
Start: 2022-09-09 | End: 2023-02-10

## 2023-02-10 NOTE — PHYSICAL EXAM
[Cooperative] : cooperative [Euthymic] : euthymic [Full] : full [Clear] : clear [None] : none [None Reported] : none reported [Average] : average [WNL] : within normal limits [Circumstantial] : circumstantial [FreeTextEntry1] : Long silver hair.  [de-identified] : Memory and train of thought appear improved this visit, less circumstantial.

## 2023-02-10 NOTE — DISCUSSION/SUMMARY
[FreeTextEntry1] : Encouraged to [facility] to the senior center. \par Pt enjoys writing painting, and photography. \par Pt states when she went to the senior center she got better sleep.

## 2023-02-10 NOTE — HISTORY OF PRESENT ILLNESS
[FreeTextEntry1] : Pt reports she is pre diabetic and is looking into her food allergies. \par Pt reports over the past year she had been less active. Pt also looking into her thyroid, as she had nodules. \par Pt states s/p her fall her brain does not work as fast as it used to, and pt reports that she was not told she has dementia. \par Pt reports she does have some abnormalities on brain MRI. Pt reports that she coping with her anxiety, denied depression, still involved in 12 step and is involved in AA. She was able to tolerate some anxiety over her grand children. \par \par Pt reports she took 150 mg Venlafaxine today, pt attributes fatigue and weight gain to this medication and has been alternating 100 mg and \par 50 mg dose.

## 2023-02-10 NOTE — PLAN
[No] : No [Medication education provided] : Medication education provided. [Rationale for medication choices, possible risks/precautions, benefits, alternative treatment choices, and consequences of non-treatment discussed] : Rationale for medication choices, possible risks/precautions, benefits, alternative treatment choices, and consequences of non-treatment discussed with patient/family/caregiver  [FreeTextEntry4] : meeting treatment goals.  [FreeTextEntry5] : plan to stop 50 mg of Venlafaxine, and will continue on 100 mg dose.

## 2023-02-27 ENCOUNTER — APPOINTMENT (OUTPATIENT)
Dept: ORTHOPEDIC SURGERY | Facility: CLINIC | Age: 78
End: 2023-02-27

## 2023-02-27 ENCOUNTER — APPOINTMENT (OUTPATIENT)
Dept: ORTHOPEDIC SURGERY | Facility: CLINIC | Age: 78
End: 2023-02-27
Payer: MEDICARE

## 2023-02-27 PROCEDURE — 20610 DRAIN/INJ JOINT/BURSA W/O US: CPT | Mod: RT

## 2023-02-27 PROCEDURE — 99214 OFFICE O/P EST MOD 30 MIN: CPT | Mod: 25

## 2023-03-06 ENCOUNTER — APPOINTMENT (OUTPATIENT)
Dept: ORTHOPEDIC SURGERY | Facility: CLINIC | Age: 78
End: 2023-03-06
Payer: MEDICARE

## 2023-03-06 PROCEDURE — 20610 DRAIN/INJ JOINT/BURSA W/O US: CPT | Mod: RT

## 2023-03-06 NOTE — DISCUSSION/SUMMARY
[de-identified] : The underlying pathophysiology was reviewed in great detail with the patient as well as the various treatment options, including ice, analgesics, NSAIDs, physical therapy, steroid injections, viscosupplementation.\par \par Activity modifications and restrictions were discussed. I advised avoiding deep bending, squatting and high intensity activity.\par \par A home exercise sheet was given and discussed with the patient to follow. \par \par I have recommended utilizing a knee sleeve to provide added support and stability. A prescription was provided.\par \par I recommended a course of physical therapy for the right knee to improve strength and ROM. A  new script was provided today .\par \par The patient elected to receive an Orthovisc injection (2/3) into her right knee today and tolerated it well. I instructed the patient on ROM exercises, and told them to take it easy. The use of ice and rest was reviewed with the patient. The patient may resume activities in a couple of days. I reminded the patient that it takes 4 to 6 weeks after the Orthovisc injection to feel symptom relief \par \par FU 1 week for orthovisc (3/3) right knee\par \par All questions were answered, all alternatives discussed and the patient is in complete agreement with that plan. Follow-up appointment as instructed. Any issues and the patient will call or come in sooner.\par

## 2023-03-06 NOTE — PROCEDURE
[de-identified] : At this point I recommended a therapeutic injection and under sterile precautions an injection of 2 cc of Orthovisc (Lot: [id number], Expiration: 10/2024), was placed into the joint of the RIGHT knee without complication (2/3)\par \par \par

## 2023-03-06 NOTE — ADDENDUM
[FreeTextEntry1] : I, Khadar Christiansen, acted solely as a scribe for Dr. Michael Mayfield on this date 03/06/2023.\par \par All medical record entries made by the Scribe were at my, Dr. Michael Mayfield, direction and personally dictated by me on 03/06/2023. I have reviewed the chart and agree that the record accurately reflects my personal performance of the history, physical exam, assessment and plan. I have also personally directed, reviewed, and agreed with the chart.\par

## 2023-03-06 NOTE — PHYSICAL EXAM
[Normal RLE] : Right Lower Extremity: No scars, rashes, lesions, ulcers, skin intact [Normal LLE] : Left Lower Extremity: No scars, rashes, lesions, ulcers, skin intact [Normal Touch] : sensation intact for touch [Normal] : Oriented to person, place, and time, insight and judgement were intact and the affect was normal [de-identified] : Right Lower Extremity\par o Knee :\par ¦ Inspection/Palpation : medial tenderness to palpation, no swelling, no deformity\par ¦ Range of Motion : 0 - 130 degrees, no crepitus\par ¦ Stability : no valgus or varus instability present on provocative testing, Lachman’s Test (-)\par ¦ Strength : flexion and extension 4/5\par o Muscle Bulk : normal muscle bulk present\par o Skin : no erythema, no ecchymosis\par o Sensation : sensation to pin intact\par o Vascular Exam : no edema, no cyanosis, dorsalis pedis artery pulse 2+, posterior tibial artery pulse 2+\par \par Left Lower Extremity\par o Knee :\par ¦ Inspection/Palpation : no tenderness to palpation, no swelling, no deformity\par ¦ Range of Motion : 0 - 130 degrees, no crepitus\par ¦ Stability : no valgus or varus instability present on provocative testing, Lachman’s Test (-)\par ¦ Strength : flexion and extension 4/5\par o Muscle Bulk : normal muscle bulk present\par o Skin : no erythema, no ecchymosis\par o Sensation : sensation to pin intact\par o Vascular Exam : no edema, no cyanosis, dorsalis pedis artery pulse 2+, posterior tibial artery pulse 2+\par \par  [de-identified] : X-ray obtained on 01/19/2023:\par o Right Knee : AP, lateral, sunrise, and Gomez views of the knee were obtained, there are no soft tissue abnormalities, no fractures, varus alignment, severe OA with bone on bone apposition at the medial compartment, normal bone density, no bony lesions.\par

## 2023-03-06 NOTE — HISTORY OF PRESENT ILLNESS
[de-identified] : XIOMARA CHENEY is a 77 year female presenting to the office complaining of right knee pain. She  presents to the office ambulating independently. PMHx Fibromyalgia and myofascial pain syndrome.  Patient reports pain since March 2022 after she fell on her knee.  The patient describes the pain as a dull aching, and occasionally sharp pain localized to the medial aspect of her right knee that is intermittent in nature. Her   symptoms are exacerbated walking, standing from a seated position and with stairs. Pain is alleviated with rest.  Patient notes instability and weakness of the knee but denies catching or locking of the knee. She notes she saw a different physician who administered a corticosteroid injection noting no relief in symptoms. Since last visit, patient notes continued pain. she did not start PT at this time, but she plans to after she receives a hyaluronic gel injections.  \par At patients last visit on 02/27/2023 patient received a Orthovisc injection (1/3) of her right knee. She denies any adverse reactions at this time. She is here today for orthovisc injection 2/3 \par Patient is taking NSAIDs/ Tylenol for pain relief with mild to moderate relief in symptoms.\par

## 2023-03-13 ENCOUNTER — APPOINTMENT (OUTPATIENT)
Dept: ORTHOPEDIC SURGERY | Facility: CLINIC | Age: 78
End: 2023-03-13
Payer: MEDICARE

## 2023-03-13 PROCEDURE — 20610 DRAIN/INJ JOINT/BURSA W/O US: CPT | Mod: RT

## 2023-06-23 ENCOUNTER — APPOINTMENT (OUTPATIENT)
Dept: PSYCHIATRY | Facility: CLINIC | Age: 78
End: 2023-06-23
Payer: MEDICARE

## 2023-06-23 PROCEDURE — 99213 OFFICE O/P EST LOW 20 MIN: CPT | Mod: 95

## 2023-06-23 NOTE — REASON FOR VISIT
[Patient preference] : as per patient preference [Continuing, patient not seen in-person within last 12 months (provide details below)] : Telehealth services are continuing, patient not seen in-person within last 12 months.  [Telehealth (audio & video) - Individual/Group] : This visit was provided via telehealth using real-time 2-way audio visual technology. [Medical Office: ([facility])___] : The provider was located at the medical office in [unfilled]. [Home] : The patient, [unfilled], was located at home, [unfilled], at the time of the visit. [Verbal consent obtained from patient/other participant(s)] : Verbal consent for telehealth/telephonic services obtained from patient/other participant(s) [Patient] : Patient [FreeTextEntry4] : 1:15 pm  [FreeTextEntry3] : pt reports feeling unwell in terms of URI sx.

## 2023-06-23 NOTE — DISCUSSION/SUMMARY
[FreeTextEntry1] : Pt wants to use up 50 mg strength of Venlafaxine, but has 100 mg sent to pharmacy. \par Pt now pours her med into a pill box.

## 2023-06-23 NOTE — PLAN
[No] : No [FreeTextEntry4] : Pt meeting treatment goals.  [FreeTextEntry5] : Continues current medications as prescribed.

## 2023-06-23 NOTE — PHYSICAL EXAM
[Cooperative] : cooperative [Euthymic] : euthymic [Full] : full [Clear] : clear [Circumstantial] : circumstantial [None] : none [None Reported] : none reported [Average] : average [WNL] : within normal limits [FreeTextEntry2] : not tested.  [FreeTextEntry1] : Long silver hair.  [de-identified] : Memory and train of thought appear improved this visit, less circumstantial.

## 2023-09-21 ENCOUNTER — APPOINTMENT (OUTPATIENT)
Dept: ORTHOPEDIC SURGERY | Facility: CLINIC | Age: 78
End: 2023-09-21
Payer: MEDICARE

## 2023-09-21 VITALS — HEIGHT: 64 IN | BODY MASS INDEX: 28.85 KG/M2 | WEIGHT: 169 LBS

## 2023-09-21 DIAGNOSIS — M17.11 UNILATERAL PRIMARY OSTEOARTHRITIS, RIGHT KNEE: ICD-10-CM

## 2023-09-21 PROCEDURE — 20610 DRAIN/INJ JOINT/BURSA W/O US: CPT | Mod: RT

## 2023-09-21 PROCEDURE — 99214 OFFICE O/P EST MOD 30 MIN: CPT | Mod: 25

## 2023-11-09 ENCOUNTER — EMERGENCY (EMERGENCY)
Facility: HOSPITAL | Age: 78
LOS: 1 days | Discharge: LEFT BEFORE DISCHARGE | End: 2023-11-09
Attending: EMERGENCY MEDICINE | Admitting: EMERGENCY MEDICINE
Payer: MEDICARE

## 2023-11-09 VITALS
HEART RATE: 74 BPM | DIASTOLIC BLOOD PRESSURE: 75 MMHG | OXYGEN SATURATION: 97 % | SYSTOLIC BLOOD PRESSURE: 122 MMHG | RESPIRATION RATE: 18 BRPM | TEMPERATURE: 98 F

## 2023-11-09 VITALS
TEMPERATURE: 97 F | HEART RATE: 86 BPM | SYSTOLIC BLOOD PRESSURE: 133 MMHG | RESPIRATION RATE: 18 BRPM | WEIGHT: 167.99 LBS | OXYGEN SATURATION: 98 % | HEIGHT: 64 IN | DIASTOLIC BLOOD PRESSURE: 91 MMHG

## 2023-11-09 PROCEDURE — 29130 APPL FINGER SPLINT STATIC: CPT | Mod: LT

## 2023-11-09 PROCEDURE — 73070 X-RAY EXAM OF ELBOW: CPT | Mod: 26,LT

## 2023-11-09 PROCEDURE — 90715 TDAP VACCINE 7 YRS/> IM: CPT

## 2023-11-09 PROCEDURE — 73562 X-RAY EXAM OF KNEE 3: CPT | Mod: 26,50

## 2023-11-09 PROCEDURE — 73090 X-RAY EXAM OF FOREARM: CPT | Mod: 26,LT

## 2023-11-09 PROCEDURE — 73070 X-RAY EXAM OF ELBOW: CPT

## 2023-11-09 PROCEDURE — 73110 X-RAY EXAM OF WRIST: CPT | Mod: 26,LT

## 2023-11-09 PROCEDURE — 90471 IMMUNIZATION ADMIN: CPT

## 2023-11-09 PROCEDURE — 99284 EMERGENCY DEPT VISIT MOD MDM: CPT | Mod: 25

## 2023-11-09 PROCEDURE — 73562 X-RAY EXAM OF KNEE 3: CPT

## 2023-11-09 PROCEDURE — 99284 EMERGENCY DEPT VISIT MOD MDM: CPT | Mod: FS

## 2023-11-09 PROCEDURE — 73110 X-RAY EXAM OF WRIST: CPT

## 2023-11-09 PROCEDURE — 73090 X-RAY EXAM OF FOREARM: CPT

## 2023-11-09 RX ORDER — TETANUS TOXOID, REDUCED DIPHTHERIA TOXOID AND ACELLULAR PERTUSSIS VACCINE, ADSORBED 5; 2.5; 8; 8; 2.5 [IU]/.5ML; [IU]/.5ML; UG/.5ML; UG/.5ML; UG/.5ML
0.5 SUSPENSION INTRAMUSCULAR ONCE
Refills: 0 | Status: COMPLETED | OUTPATIENT
Start: 2023-11-09 | End: 2023-11-09

## 2023-11-09 RX ADMIN — TETANUS TOXOID, REDUCED DIPHTHERIA TOXOID AND ACELLULAR PERTUSSIS VACCINE, ADSORBED 0.5 MILLILITER(S): 5; 2.5; 8; 8; 2.5 SUSPENSION INTRAMUSCULAR at 19:20

## 2023-11-09 NOTE — ED PROVIDER NOTE - CLINICAL SUMMARY MEDICAL DECISION MAKING FREE TEXT BOX
ANDREASATEL: 76 y/o female with PMH of fibromyalgia, asthma, here with significant other, p/w R ribcage pain s/p trip and fall yesterday, pt complains of pain all over body but in particular R ribcage, b/l knees, L wrist pain, elbow pain, shoulder pain, no head injury no LOC, witnessed fall, no anticoagulation, pertinent on exam: vitals stable, pt in NAD, Head NC/AT. Neck no midline tenderness, no step off, Lungs CTA b/l + tenderness anterior axillary line around nipple line, no ecchymosis, Cardiac RRR, Abdomen no RUQ tenderness, Back no midline tenderness, Extremities + abrasion R knee, FROM, L knee + prepatellar swelling, FRom NVI, LUE pt sensitive to touch full arm however wrist is only location with bony tenderness, + snuffbox tenderness NVI, AxOx3, CN II-CXII intact, no focal neurologic deficit  Ddx includes but not limited to Rib fx, rib contusion, PTX, hemothorax, fractures,   Will get CT chest, head/ neck, xrays, pain management, follow up ortho, thumb spica splint

## 2023-11-09 NOTE — ED PROCEDURE NOTE - NS ED ATTENDING STATEMENT MOD
This was a shared visit with the ANNA. I reviewed and verified the documentation and independently performed the documented: (9/13) POCT 107 (H)

## 2023-11-09 NOTE — ED ADULT TRIAGE NOTE - CHIEF COMPLAINT QUOTE
patient ambulatory to triage a&ox4 trip and fall yesterday (denies hitting head/LOC) c/o right side rib pain worse with deep breath. o2 sat WNL on RA. also with left knee/wrist pain, moving all extremities     left knee, left wrist pain.

## 2023-11-09 NOTE — ED PROVIDER NOTE - PHYSICAL EXAMINATION
JPATEL:   HEAD: NC/AT, PERRLA  NECK: no midline stepoff, defomity no new midline tenderness  Chest wall + posterior ribcage tenderness, 5th,6th  Lungs CTA b/l  Abdomen soft NT/ND  Pelvis stable  Back no CVA tenderness, + mid thoracic tenderness T8-T9  LUE + tenderness distal radius with snuffbox tenderness  L elbow + tenderness on palpation, no ecchymosis, FROM   NVI  AxOx3  CN II-XII intact JPATEL:   HEAD: NC/AT, PERRLA  NECK: no midline stepoff, deformity no new midline tenderness  Chest wall + posterior ribcage tenderness, 5th,6th  Lungs CTA b/l  Abdomen soft NT/ND  Pelvis stable, FROM of hips b/l  Back no CVA tenderness, + mid thoracic tenderness T8-T9  LUE + tenderness distal radius with snuffbox tenderness  L elbow + tenderness on palpation, no ecchymosis, FROM   NVI  AxOx3  CN II-XII intact  Skin: Superficial abrasions on b/l knees

## 2023-11-09 NOTE — ED ADULT TRIAGE NOTE - WHEN WAS YOUR LAST VACCINATION? YEAR
Infectious Disease Progress Note    Author: Bernadine Asif M.D. Date & Time of service: 2018  3:17 PM    Chief Complaint:  Left thumb infection    Interval History:  2018-no fevers.  Slightly sedated due to risperidone.  As per the nursing patient was earlier alert oriented x3  Labs Reviewed, Medications Reviewed, Radiology Reviewed and Wound Reviewed.    Review of Systems:  Review of Systems   Unable to perform ROS: Other (Patient just took risperidone)       Hemodynamics:  Temp (24hrs), Av.3 °C (97.4 °F), Min:36.2 °C (97.2 °F), Max:36.4 °C (97.5 °F)  Temperature: 36.2 °C (97.2 °F)  Pulse  Av.7  Min: 56  Max: 118   Blood Pressure: 118/54       Physical Exam:  Physical Exam   Constitutional: No distress.   HENT:   Mouth/Throat: No oropharyngeal exudate.   Edentulous   Eyes: Pupils are equal, round, and reactive to light. No scleral icterus.   Neck: Neck supple.   Cardiovascular: Regular rhythm.    No murmur heard.  Pulmonary/Chest: He has no wheezes. He has no rales.   Abdominal: Soft. There is no tenderness. There is no rebound and no guarding.   Musculoskeletal: He exhibits no edema or tenderness.   Left hand is bandaged   Lymphadenopathy:     He has no cervical adenopathy.   Neurological:   Sleepy   Skin: Skin is warm. No erythema.   Vitals reviewed.      Meds:    Current Facility-Administered Medications:   •  risperiDONE  •  levETIRAcetam  •  senna-docusate **AND** polyethylene glycol/lytes **AND** magnesium hydroxide **AND** bisacodyl  •  NS  •  NS  •  heparin  •  ampicillin-sulbactam (UNASYN) IV  •  MD Alert...Vancomycin per Pharmacy  •  nicotine **AND** Nicotine Replacement Patient Education Materials **AND** nicotine polacrilex  •  ondansetron  •  ondansetron  •  promethazine  •  promethazine  •  prochlorperazine  •  acetaminophen  •  Notify provider if pain remains uncontrolled **AND** Use the numeric rating scale (NRS-11) on regular floors and Critical-Care Pain Observation Tool  (CPOT) on ICUs/Trauma to assess pain **AND** Pulse Ox (Oximetry) **AND** Pharmacy Consult Request **AND** If patient difficult to arouse and/or has respiratory depression, stop any opiates that are currently infusing and call a Rapid Response. **AND** oxyCODONE immediate-release **AND** oxyCODONE immediate-release **AND** [DISCONTINUED] morphine injection  •  haloperidol lactate    Labs:  Recent Labs      12/06/18 0235 12/07/18   0430   WBC  14.2*  7.3   RBC  5.15  4.38*   HEMOGLOBIN  16.0  13.2*   HEMATOCRIT  45.0  39.3*   MCV  87.4  89.7   MCH  31.1  30.1   RDW  39.2  41.6   PLATELETCT  398  362   MPV  8.9*  8.7*   NEUTSPOLYS  68.70   --    LYMPHOCYTES  19.20*   --    MONOCYTES  11.20   --    EOSINOPHILS  0.10   --    BASOPHILS  0.40   --      Recent Labs      12/06/18 0235 12/07/18   0430   SODIUM  134*  137   POTASSIUM  3.3*  4.0   CHLORIDE  98  107   CO2  26  24   GLUCOSE  98  108*   BUN  9  9     Recent Labs      12/06/18 0235 12/07/18   0430   ALBUMIN  4.1   --    TBILIRUBIN  1.5   --    ALKPHOSPHAT  80   --    TOTPROTEIN  8.0   --    ALTSGPT  23   --    ASTSGOT  32   --    CREATININE  1.12  0.87       Imaging:  Ct-hand With Left    Result Date: 12/6/2018 12/6/2018 3:36 AM HISTORY/REASON FOR EXAM:  Crush injury to left hand with swelling and discoloration of left thumb. TECHNIQUE/ EXAM DESCRIPTION AND NUMBER OF VIEWS: CT scan of the LEFT hand with contrast, with reconstructions. Thin-section noncontrast helical images were obtained from the distal radius/ulna through the proximal metacarpals. Coronal and sagittal reconstructions were generated from the axial images. A total of 80 mL of Omnipaque 350 nonionic contrast was administered IV without complication. Up to date radiation dose reduction adjustments have been utilized to meet ALARA standards for radiation dose reduction. COMPARISON:  None. FINDINGS: No hand or wrist fracture or dislocation is identified. There is diffuse edema overlying the  extensor aspect of the hand and wrist. There is soft tissue swelling along the extensor aspect of the left thumb diffusely consistent with hematoma or edema.     1.  Extensive subcutaneous fluid along the extensor aspect of the left thumb which may represent edema or hematoma. 2.  Extensive fluid or edema overlying the extensor aspect of the left hand and wrist. 3.  No acute hand fracture or dislocation.      Micro:  Results     Procedure Component Value Units Date/Time    CULTURE WOUND W/ GRAM STAIN [[id number]]  (Abnormal)  (Susceptibility) Collected:  12/06/18 1137    Order Status:  Completed Specimen:  Wound Updated:  12/08/18 1005     Significant Indicator POS (POS)     Source WND     Site Left Thumb Infection     Culture Result Wound -- (A)     Gram Stain Result Rare WBCs.  No organisms seen.       Culture Result Wound Staphylococcus aureus  Light growth   (A)      Beta Hemolytic Streptococcus group A  Light growth   (A)    Narrative:       CALL  Mora  131 tel. [phone],  CALLED  131 tel. [phone] 12/07/2018, 15:15, RB PERF. RESULTS CALLED TO:  Adilene Go RN    Culture & Susceptibility     STAPHYLOCOCCUS AUREUS     Antibiotic Sensitivity Microscan Unit Status    Ampicillin/sulbactam Sensitive <=8/4 mcg/mL Final    Method: SENSITIVITY, YOHAN    Clindamycin Sensitive <=0.5 mcg/mL Final    Method: SENSITIVITY, YOHAN    Daptomycin Sensitive 1 mcg/mL Final    Method: SENSITIVITY, YOHAN    Erythromycin Sensitive <=0.5 mcg/mL Final    Method: SENSITIVITY, YOHAN    Moxifloxacin Sensitive <=0.5 mcg/mL Final    Method: SENSITIVITY, YOHAN    Oxacillin Sensitive <=0.25 mcg/mL Final    Method: SENSITIVITY, YOHAN    Tetracycline Sensitive <=4 mcg/mL Final    Method: SENSITIVITY, YOHAN    Trimeth/Sulfa Sensitive <=0.5/9.5 mcg/mL Final    Method: SENSITIVITY, YOHAN    Vancomycin Sensitive 2 mcg/mL Final    Method: SENSITIVITY, YOHAN                       ANAEROBIC CULTURE [[id number]] Collected:  12/06/18 1137    Order Status:   "Completed Specimen:  Wound Updated:  12/08/18 1005     Significant Indicator NEG     Source WND     Site Left Thumb Infection     Anaerobic Culture, Culture Res Culture in progress.    Narrative:       CALL  Mora  131 tel. [phone],  CALLED  131 tel. [phone] 12/07/2018, 15:15, RB PERF. RESULTS CALLED TO:  Adilene Go RN    BLOOD CULTURE x2 [[id number]] Collected:  12/06/18 0235    Order Status:  Completed Specimen:  Blood from Peripheral Updated:  12/07/18 0914     Significant Indicator NEG     Source BLD     Site PERIPHERAL     Blood Culture No Growth    Note: Blood cultures are incubated for 5 days and  are monitored continuously.Positive blood cultures  are called to the RN and reported as soon as  they are identified.      Narrative:       Per Hospital Policy: Only change Specimen Src: to \"Line\" if  specified by physician order.    BLOOD CULTURE x2 [[id number]] Collected:  12/06/18 0230    Order Status:  Completed Specimen:  Blood from Peripheral Updated:  12/07/18 0913     Significant Indicator NEG     Source BLD     Site PERIPHERAL     Blood Culture No Growth    Note: Blood cultures are incubated for 5 days and  are monitored continuously.Positive blood cultures  are called to the RN and reported as soon as  they are identified.      Narrative:       Per Hospital Policy: Only change Specimen Src: to \"Line\" if  specified by physician order.    MRSA BY PCR (AMP) [[id number]]     Order Status:  No result Specimen:  Respirate from Nares     GRAM STAIN [[id number]] Collected:  12/06/18 1137    Order Status:  Completed Specimen:  Wound Updated:  12/06/18 1519     Significant Indicator .     Source WND     Site Left Thumb Infection     Gram Stain Result Rare WBCs.  No organisms seen.            Assessment:  Active Hospital Problems    Diagnosis   • *Infected abrasion of hand, left, initial encounter [S60.512A, L08.9]   • SIRS (systemic inflammatory response syndrome) (HCC) [R65.10]   • Tobacco abuse [Z72.0]   • " Polysubstance abuse ([facility]) [F19.10]   • Noncompliance [Z91.19]   • Hx of seizure disorder [Z86.69]   • Bipolar disorder, mixed ([facility]) [F31.60]       Plan:  Left thumb infection/tenosynovitis  Underwent I&D on 12/6/2018  OR cultures are MSSA and group A strep  Discontinue vancomycin  Continue Unasyn  May be able to discharge on p.o. Clindamycin    Polysubstance abuse  Not a candidate for IV antibiotics  Discussed with internal medicine.   2023

## 2023-11-09 NOTE — ED PROVIDER NOTE - CARE PLAN
Principal Discharge DX:	Fall   1 Principal Discharge DX:	Rib pain on right side  Secondary Diagnosis:	Fall

## 2023-11-09 NOTE — ED PROVIDER NOTE - PROGRESS NOTE DETAILS
REGINA Martinez:  X-ray images reviewed no acute fractures.  CAT scan machine is down at [facility] patient was scheduled to be transferred to [facility] for CT of the chest and thoracic spine.  However patient no longer wants to wait for the CAT scan.  She is ANO x3 she wants to sign out AGAINST MEDICAL ADVICE.  She verbalized understanding of the risks associated with this decision.  Her boyfriend who is at bedside also witnessed conversation and is in agreement with patient signing out AGAINST MEDICAL ADVICE.  Patient understands that she is at risk for a life-threatening condition being missed, multiple rib fractures being missed and medical pain and suffering.  Patient informed that if her symptoms worsen or she changes her mind she can always return to the ER for CAT scan.  Will place patient in a thumb spica splint due to snuffbox tenderness

## 2023-11-09 NOTE — ED PROVIDER NOTE - OBJECTIVE STATEMENT
77-year-old female past medical history of left vertebral artery looping, fibromyalgia, asthma presents to the ED with complaint of a mechanical trip and fall yesterday.  Patient complains of right rib pain, bilateral knee pain and left wrist pain.  She denies any head injury or LOC.  Patient's boyfriend at bedside witnessed the fall, he confirms that there was no head injury or LOC.  No anticoagulation use reported. Reports when she takes a deep breathes the right rib under the breast hurts. last tdap unknown

## 2023-11-09 NOTE — ED ADULT NURSE REASSESSMENT NOTE - NS ED NURSE REASSESS COMMENT FT1
Spoke to patient stated they cannot wait for CT transport to [place]; will sign AMA will just go to [place]
pt left AMA and verbalizes understanding pt refusing to wait  for ct to be transferred to [place] and pt verbalizes understanding to follow up
pt reavaluated vital signs stable awaiting ct scan to be transferred to [place] for ct

## 2023-11-09 NOTE — ED PROVIDER NOTE - ATTENDING APP SHARED VISIT CONTRIBUTION OF CARE
Attending MD Arriaga;:   I personally have seen and examined this patient.  Physician assistant note reviewed and agree on plan of care and except where noted.  See MDM for details.

## 2023-11-09 NOTE — ED ADULT TRIAGE NOTE - PRO INTERPRETER NEED 2
Patient:  Rosanne Fearing   1953           SANTINO Sepulveda saw Rosanne Gordon for a wound care visit on 1/5/2022  See below for information relating to this visit  No chief complaint on file  Assessment/Plan:  1  Pressure injury of sacral region, unstageable [facility])  Assessment & Plan:  Sacrum  - Wound size - 1 2 cm x 1 7 cm x 0 1cm, 100% slough, no obvious sign of infection  - local wound care with hydrolloid  - offload  - recommended to start on Alon, patient have poor appetite  - follow up next week      Orders:  -     Wound cleansing and dressings; Future    2  Pressure injury of right buttock, unstageable (HCC)  Assessment & Plan:  Right buttock  - Wound size - 3 5 cm x 3 5 cm x 0 1cm, with 50% slough, no obvious sign of infection  - local wound care with hydrolloid  - offload  - recommended to start on Alon, patient have poor appetite  - follow up next week      Orders:  -     Wound cleansing and dressings; Future    3  Pressure injury of left buttock, stage 3 (HCC)  Assessment & Plan:  Left buttock  - Wound size - 1 0 cm x 1 2 cm x 0 1cm, 100% granulation, no obvious sign of infection  - local wound care with hydrolloid  - offload  - recommended to start on Alon, patient have poor appetite  - follow up next week    Orders:  -     Wound cleansing and dressings; Future    4  Ambulatory dysfunction  Assessment & Plan:  On STR             Orders:  Rosanne Perkinsing  1953  Orders Placed This Encounter   Procedures    Wound cleansing and dressings     Wound:  Buttocks and sacrum  Discontinue previous wound order  Cleanse the wound bed with NSS   Apply non-sting skin prep to periwound area  Apply hydrocolloid to wound bed  Frequency : Every other day  and prn for soiling  Offload all wounds  Turn and reposition frequently, maximum of every two hours  Instruct / Assist with weight shifting every 15 - 20 minutes when in chair  Increase protein intake    Monitor for any sign of infection or worsening, inform PCP or patient's primary physician in your facility  Standing Status:   Future     Standing Expiration Date:   1/5/2023         Follow Up:  Return in about 1 week (around 1/12/2022)  [facility] hours are 8:00 am - 4:30 pm Monday through Friday  The center phone number is [phone]  You can also contact me directly thru my email at [facility]  [email or web address] or thru tiger text  If it is an emergency, please contact the PCP or patient's attending physician in your facility       Sincerely,    Electronically signed by SANTINO Gutierrez    Patient : Beula Barthel    1953 [place]

## 2023-11-09 NOTE — ED PROVIDER NOTE - PATIENT PORTAL LINK FT
You can access the FollowMyHealth Patient Portal offered by [facility] by registering at the following website: [email or web address]. By joining [facility]’s FollowMyHealth portal, you will also be able to view your health information using other applications (apps) compatible with our system.

## 2023-11-09 NOTE — ED PROVIDER NOTE - CARE PROVIDER_API CALL
Ernesto Mcknight  Orthopaedic Surgery  [address] [place] [id number]  Phone: [phone]  Fax: [phone]  Follow Up Time:

## 2023-11-09 NOTE — ED ADULT NURSE NOTE - OBJECTIVE STATEMENT
Pt brought to ED c/o BL knee pain L shoulder and R lateral side pain s/p fall.  Pt states she missed the curb and accidently fell.   No bruising noted to rib cage.  BL knee abrasions noted.   Pt denies cp/sob/palpitations.   ROM limited to L shoulder only, no additional limitation to joints.  No swelling noted.  +distal pulses., BN

## 2023-11-09 NOTE — ED ADULT NURSE NOTE - NSFALLRISKINTERV_ED_ALL_ED

## 2023-11-09 NOTE — ED PROVIDER NOTE - NSFOLLOWUPINSTRUCTIONS_ED_ALL_ED_FT
Follow up with your primary care physician within 2-3 days. Follow up with the orthopedic specialist as discussed.     Keep your splint clean and dry      Stay hydrated    Return to the ER if your symptoms worsen or for any other medical emergencies  ************    Against Medical Advice    WHAT YOU NEED TO KNOW:    Discharge against medical advice means that you choose to leave the hospital before your healthcare provider recommends that you do. Your healthcare provider may still need to diagnose or treat your condition or your treatment may not be complete.    DISCHARGE INSTRUCTIONS:    Risks: Risks of leaving the hospital before your healthcare providers recommend include the following:    Your condition may cause other health problems if not treated properly.    You may need to be admitted to the hospital again for the same condition.    Your condition could become life-threatening.  Follow up with your doctor as directed: Write down your questions so you remember to ask them during your visits.

## 2023-11-10 ENCOUNTER — EMERGENCY (EMERGENCY)
Facility: HOSPITAL | Age: 78
LOS: 1 days | Discharge: HOME | End: 2023-11-10
Attending: EMERGENCY MEDICINE | Admitting: EMERGENCY MEDICINE
Payer: MEDICARE

## 2023-11-10 VITALS
TEMPERATURE: 98 F | HEART RATE: 90 BPM | RESPIRATION RATE: 20 BRPM | DIASTOLIC BLOOD PRESSURE: 80 MMHG | OXYGEN SATURATION: 99 % | SYSTOLIC BLOOD PRESSURE: 129 MMHG

## 2023-11-10 VITALS
TEMPERATURE: 98 F | SYSTOLIC BLOOD PRESSURE: 126 MMHG | HEIGHT: 64 IN | DIASTOLIC BLOOD PRESSURE: 86 MMHG | HEART RATE: 91 BPM | RESPIRATION RATE: 20 BRPM | OXYGEN SATURATION: 98 % | WEIGHT: 167.99 LBS

## 2023-11-10 PROCEDURE — 72128 CT CHEST SPINE W/O DYE: CPT | Mod: MA

## 2023-11-10 PROCEDURE — 71250 CT THORAX DX C-: CPT | Mod: 26,MA

## 2023-11-10 PROCEDURE — 71250 CT THORAX DX C-: CPT | Mod: MA

## 2023-11-10 PROCEDURE — 72131 CT LUMBAR SPINE W/O DYE: CPT | Mod: 26,MA

## 2023-11-10 PROCEDURE — 99284 EMERGENCY DEPT VISIT MOD MDM: CPT | Mod: FS

## 2023-11-10 PROCEDURE — 99284 EMERGENCY DEPT VISIT MOD MDM: CPT | Mod: 25

## 2023-11-10 PROCEDURE — 72131 CT LUMBAR SPINE W/O DYE: CPT | Mod: MA

## 2023-11-10 PROCEDURE — 72128 CT CHEST SPINE W/O DYE: CPT | Mod: 26,MA

## 2023-11-10 NOTE — ED PROVIDER NOTE - DIFFERENTIAL DIAGNOSIS
Differentials include but not limited to sprain, strain, contusion, fracture, pneumothorax.  Denies hitting head or LOC.  Do not suspect intracranial hemorrhage or skull fracture Differential Diagnosis

## 2023-11-10 NOTE — ED PROVIDER NOTE - OBJECTIVE STATEMENT
77-year-old female with history of myofascial pain, COPD, anxiety, depression, fibromyalgia, and asthma presents with right-sided rib pain and mid back pain after mechanical fall 2 days ago.  Patient states she was walking and tripped over a curb.  Fell onto right side of ribs.  Also has some abrasions to knee and some left wrist and elbow pain.  Denies hitting head or LOC.  Does not take any blood thinners.  No LOC.  Denies headache, dizziness, confusion, memory loss.  Was sitting at [place] emergency room yesterday and had x-rays of bilateral knees, left wrist, left elbow, and forearm.  Recommend CAT scan of ribs at that time and thoracic spine but CT scanner was not functioning.  Patient did not want to wait to be transferred to another hospital to have a CAT scan performed so came to this emergency room today.  Denies any shortness of breath.  Pain moderate in nature.  Patient reports ambulatory with steady gait.  Has been taking over-the-counter Tylenol and Motrin with improvement of pain.  PCP Denzel Lopez

## 2023-11-10 NOTE — ED ADULT TRIAGE NOTE - SPO2 (%)
[FreeTextEntry1] : 13 y/o with headache\par no red flag sx\par likely multifactorial, including seasonal allergies, + screen time + increase in prozac\par discussed good sleep habits, less screen time, increase water\par talk to psychiatrist about h/a as possible side effect of prozac increase\par already has neuro apt set for May- keep that f/u\par continue allergy medications as prescribed \par call if worsening s/s or concerns\par  20 min spent with pt and parent 98

## 2023-11-10 NOTE — ED ADULT NURSE NOTE - OBJECTIVE STATEMENT
76yo female walked into ED, pt c/o right sided rib pain. pt indicates "I feel yesterday and went to [place], there CT was down". pt denies LOC, head, neck and back pain. pt ambulates well.

## 2023-11-10 NOTE — ED ADULT NURSE NOTE - CHIEF COMPLAINT QUOTE
I fell  on Thursday and I was at [facility] yesterday, I was told to come back here today for a CT scan

## 2023-11-10 NOTE — ED PROVIDER NOTE - CARE PROVIDER_API CALL
Denzel Oglesby  Family Medicine  [address] [place] [address], [address]  [place] [address]  Phone: [phone]  Fax: [phone]  Follow Up Time: 1-3 Days

## 2023-11-10 NOTE — ED PROVIDER NOTE - CLINICAL SUMMARY MEDICAL DECISION MAKING FREE TEXT BOX
77-year-old female with trip and fall, right-sided rib, back patient awake and alert, able to ambulate without assistance skin/wounds, no ecchymosis, patient was tender to right lower ribs follow-up CT scan, pain control and reevaluate

## 2023-11-10 NOTE — ED ADULT TRIAGE NOTE - CHIEF COMPLAINT QUOTE
I fell  on Thursday and I was at [facility] yesterday, I was told to come back here today for a CT scan

## 2023-11-10 NOTE — ED PROVIDER NOTE - CARE PLAN
1 Principal Discharge DX:	Rib contusion  Secondary Diagnosis:	Back strain  Secondary Diagnosis:	Left wrist sprain  Secondary Diagnosis:	Knee sprain

## 2023-11-10 NOTE — ED PROVIDER NOTE - PROGRESS NOTE DETAILS
Patient stable.  Declined any pain medication.  CAT scan of chest, thoracic spine, and lumbar spine shows no acute fracture or pneumothorax.  X-rays reviewed that were performed yesterday at [facility] of wrist, elbow, and knees, no obvious fracture visualized.  Incentive spirometry discussed.  Recommend to continue Tylenol Motrin over-the-counter for pain and discomfort.   will follow-up with primary doctor.  Copies of CAT scan results provided to patient.  Overall patient appears well.  Ambulatory with steady gait

## 2023-11-10 NOTE — ED PROVIDER NOTE - NSFOLLOWUPINSTRUCTIONS_ED_ALL_ED_FT
Ice  Tylenol or Motrin over-the-counter for pain and discomfort  Take 5 deep breaths each hour  Follow-up with primary care doctor      Rib Contusion  A rib contusion is a deep bruise on the rib area. Contusions are the result of a blunt trauma that causes bleeding and injury to the tissues under the skin. A rib contusion may involve bruising of the ribs and of the skin and muscles in the area. The skin over the contusion may turn blue, purple, or yellow. Minor injuries result in a painless contusion. More severe contusions may be painful and swollen for a few weeks.    What are the causes?  This condition is usually caused by a hard, direct hit to an area of the body. This often occurs while playing contact sports.    What are the signs or symptoms?  Symptoms of this condition include:  Swelling and redness of the injured area.  Discoloration of the injured area.  Tenderness and soreness of the injured area.  Pain with or without movement.  Pain when breathing in.  How is this diagnosed?  This condition may be diagnosed based on:  Your symptoms and medical history.  A physical exam.  Imaging tests—such as an X-ray, CT scan, or MRI—to determine if there were internal injuries or broken bones (fractures).  How is this treated?  This condition may be treated with:  Rest. This is often the best treatment for a rib contusion.  Ice packs. This reduces swelling and inflammation.  Deep-breathing exercises. These may be recommended to reduce the risk for lung collapse and pneumonia.  Medicines. Over-the-counter or prescription medicines may be given to control pain.  Injection of a numbing medicine around the nerve near your injury (nerve block).  Follow these instructions at home:  Medicines    Take over-the-counter and prescription medicines only as told by your health care provider.  Ask your health care provider if the medicine prescribed to you:  Requires you to avoid driving or using machinery.  Can cause constipation. You may need to take these actions to prevent or treat constipation:  Drink enough fluid to keep your urine pale yellow.  Take over-the-counter or prescription medicines.  Eat foods that are high in fiber, such as beans, whole grains, and fresh fruits and vegetables.  Limit foods that are high in fat and processed sugars, such as fried or sweet foods.  Managing pain, stiffness, and swelling      If directed, put ice on the injured area. To do this:  Put ice in a plastic bag.  Place a towel between your skin and the bag.  Leave the ice on for 20 minutes, 2–3 times a day.  Remove the ice if your skin turns bright red. This is very important. If you cannot feel pain, heat, or cold, you have a greater risk of damage to the area.  Activity    Rest the injured area.  Avoid strenuous activity and any activities or movements that cause pain. Be careful during activities, and avoid bumping the injured area.  Do not lift anything that is heavier than 5 lb (2.3 kg), or the limit that you are told, until your health care provider says that it is safe.  General instructions      Do not use any products that contain nicotine or tobacco, such as cigarettes, e-cigarettes, and chewing tobacco. These can delay healing. If you need help quitting, ask your health care provider.  Do deep-breathing exercises as told by your health care provider.  If you were given an incentive spirometer, use it every 1–2 hours while you are awake, or as recommended by your health care provider. This device measures how well you are filling your lungs with each breath.  Keep all follow-up visits. This is important.  Contact a health care provider if you have:  Increased bruising or swelling.  Pain that is not controlled with treatment.  A fever.  Get help right away if you:  Have difficulty breathing or shortness of breath.  Develop a continual cough, or you cough up thick or bloody mucus from your lungs (sputum).  Feel nauseous or you vomit.  Have pain in your abdomen.  These symptoms may represent a serious problem that is an emergency. Do not wait to see if the symptoms will go away. Get medical help right away. Call your local emergency services (911 in the [place] Do not drive yourself to the hospital.    Summary  A rib contusion is a deep bruise on your rib area. Contusions are the result of a blunt trauma that causes bleeding and injury to the tissues under the skin.  The skin over the contusion may turn blue, purple, or yellow. Minor injuries may cause a painless contusion. More severe contusions may be painful and swollen for a few weeks.  Rest the injured area. Avoid strenuous activity and any activities or movements that cause pain.  This information is not intended to replace advice given to you by your health care provider. Make sure you discuss any questions you have with your health care provider.

## 2023-11-10 NOTE — ED PROVIDER NOTE - MUSCULOSKELETAL, MLM
mild soft tissue tenderness to bilateral knees, left wrist, left elbow. no deformities. full ROM of all ext. +tenderness to thoracic and lumbar paraspinals. no vert step off deformities

## 2023-11-16 NOTE — ED PROVIDER NOTE - CLINICAL SUMMARY MEDICAL DECISION MAKING FREE TEXT BOX
ID number [id number] used for the call.    Taya GARCIA for attending Dr. Alvarado: 77 y/o female with a PMHx of chronic pain and fibromyalgia was seen in the ED on 2022 after a fall and was c/o pain in the neck and back. Pt had a CT of her cervical & lumbar spine which were negative for fracture. Pt refused pain medications at that time and was seen by her neurologist & orthopedist this week for follow up. Today, pt c/o continued pain in the upper back and was referred back to the ED for imaging of the thoracic spine to rule out fracture. Pt denies bowel or bladder incontinence, gait abnormality, and other acute symptoms or problems. Pt only took 1 Motrin for pain and refuses other pain medications. No other complaints at this time.     Physical Exam: minor tenderness over mid thoracic spine. No deformity or bruising. FROMI. Neuro: no deficits & gait normal.     Impression: back pain.    Plan: CT thoracic spine and follow up with pain management, orthopedics, and neurology as discussed.

## 2023-12-15 ENCOUNTER — APPOINTMENT (OUTPATIENT)
Dept: PSYCHIATRY | Facility: CLINIC | Age: 78
End: 2023-12-15
Payer: MEDICARE

## 2023-12-15 DIAGNOSIS — F43.10 POST-TRAUMATIC STRESS DISORDER, UNSPECIFIED: ICD-10-CM

## 2023-12-15 PROCEDURE — 99442: CPT | Mod: 95
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
## 2023-12-15 NOTE — PHYSICAL EXAM
[Cooperative] : cooperative [Euthymic] : euthymic [Full] : full [Clear] : clear [Circumstantial] : circumstantial [None] : none [None Reported] : none reported [Average] : average [WNL] : within normal limits [FreeTextEntry2] : not tested.  [FreeTextEntry1] : Long silver hair.  [de-identified] : Pt states she has worked on forgiving her mother, and states this had been a goal of hers for the past year.

## 2023-12-15 NOTE — PLAN
[No] : No [Medication education provided] : Medication education provided. [Rationale for medication choices, possible risks/precautions, benefits, alternative treatment choices, and consequences of non-treatment discussed] : Rationale for medication choices, possible risks/precautions, benefits, alternative treatment choices, and consequences of non-treatment discussed with patient/family/caregiver  [FreeTextEntry4] : Meeting treatment goals. Total time of call 18 minutes.  [FreeTextEntry5] : Continue medications as prescribed, will arrange next meeting to be face to face.

## 2023-12-15 NOTE — REASON FOR VISIT
[Patient preference] : as per patient preference [Continuing, patient seen in-person within last 12 months] : Telehealth services are continuing as patient has been seen in-person within last 12 months. [Telephone (audio) - Individual/Group] : This telephonic visit was provided via audio only technology. [Technical or other issues] : Patient unable to effectively utilize tele-video due to technical or other issues. [Medical Office: ([facility])___] : The provider was located at the medical office in [unfilled]. [Home] : The patient, [unfilled], was located at home, [unfilled], at the time of the visit. [Verbal consent obtained from patient/other participant(s)] : Verbal consent for telehealth/telephonic services obtained from patient/other participant(s) [Patient] : Patient [Access issues (e.g., transportation, impaired mobility, etc.)] : due to patient's access issues [Continuity of care] : to ensure continuity of care [FreeTextEntry4] : 11:18 am  [FreeTextEntry5] : 11: 36 am [FreeTextEntry2] : 02/10/2023 [FreeTextEntry3] : Pt reports she has been ill.  [FreeTextEntry1] : Pt has a follow up visit.

## 2023-12-15 NOTE — DISCUSSION/SUMMARY
[FreeTextEntry1] : Both patient and writer attempted to connect via telehealth.  Due to technical issues, this was an audio call only.

## 2024-02-09 ENCOUNTER — APPOINTMENT (OUTPATIENT)
Dept: PSYCHIATRY | Facility: CLINIC | Age: 79
End: 2024-02-09

## 2024-02-26 RX ORDER — VENLAFAXINE 100 MG/1
100 TABLET ORAL
Qty: 30 | Refills: 0 | Status: ACTIVE | COMMUNITY
Start: 2019-10-07 | End: 1900-01-01

## 2024-03-08 ENCOUNTER — APPOINTMENT (OUTPATIENT)
Dept: PSYCHIATRY | Facility: CLINIC | Age: 79
End: 2024-03-08

## 2024-07-01 ENCOUNTER — APPOINTMENT (OUTPATIENT)
Dept: PSYCHIATRY | Facility: CLINIC | Age: 79
End: 2024-07-01

## 2024-10-08 ENCOUNTER — EMERGENCY (EMERGENCY)
Facility: HOSPITAL | Age: 79
LOS: 1 days | Discharge: HOME | End: 2024-10-08
Attending: STUDENT IN AN ORGANIZED HEALTH CARE EDUCATION/TRAINING PROGRAM | Admitting: STUDENT IN AN ORGANIZED HEALTH CARE EDUCATION/TRAINING PROGRAM
Payer: MEDICARE

## 2024-10-08 VITALS
TEMPERATURE: 98 F | SYSTOLIC BLOOD PRESSURE: 131 MMHG | HEIGHT: 64 IN | OXYGEN SATURATION: 99 % | HEART RATE: 112 BPM | RESPIRATION RATE: 20 BRPM | DIASTOLIC BLOOD PRESSURE: 84 MMHG | WEIGHT: 166.89 LBS

## 2024-10-08 LAB
ALBUMIN SERPL ELPH-MCNC: 3.8 G/DL — SIGNIFICANT CHANGE UP (ref 3.3–5)
ALP SERPL-CCNC: 75 U/L — SIGNIFICANT CHANGE UP (ref 40–120)
ALT FLD-CCNC: 41 U/L — SIGNIFICANT CHANGE UP (ref 12–78)
ANION GAP SERPL CALC-SCNC: 13 MMOL/L — SIGNIFICANT CHANGE UP (ref 5–17)
APTT BLD: 30.8 SEC — SIGNIFICANT CHANGE UP (ref 24.5–35.6)
AST SERPL-CCNC: 24 U/L — SIGNIFICANT CHANGE UP (ref 15–37)
BASOPHILS # BLD AUTO: 0.01 K/UL — SIGNIFICANT CHANGE UP (ref 0–0.2)
BASOPHILS NFR BLD AUTO: 0.1 % — SIGNIFICANT CHANGE UP (ref 0–2)
BILIRUB SERPL-MCNC: 0.5 MG/DL — SIGNIFICANT CHANGE UP (ref 0.2–1.2)
BUN SERPL-MCNC: 24 MG/DL — HIGH (ref 7–23)
CALCIUM SERPL-MCNC: 8.9 MG/DL — SIGNIFICANT CHANGE UP (ref 8.5–10.1)
CHLORIDE SERPL-SCNC: 105 MMOL/L — SIGNIFICANT CHANGE UP (ref 96–108)
CO2 SERPL-SCNC: 18 MMOL/L — LOW (ref 22–31)
CREAT SERPL-MCNC: 0.96 MG/DL — SIGNIFICANT CHANGE UP (ref 0.5–1.3)
EGFR: 60 ML/MIN/1.73M2 — SIGNIFICANT CHANGE UP
EOSINOPHIL # BLD AUTO: 0.01 K/UL — SIGNIFICANT CHANGE UP (ref 0–0.5)
EOSINOPHIL NFR BLD AUTO: 0.1 % — SIGNIFICANT CHANGE UP (ref 0–6)
GLUCOSE SERPL-MCNC: 157 MG/DL — HIGH (ref 70–99)
HCT VFR BLD CALC: 40 % — SIGNIFICANT CHANGE UP (ref 34.5–45)
HGB BLD-MCNC: 13.3 G/DL — SIGNIFICANT CHANGE UP (ref 11.5–15.5)
IMM GRANULOCYTES NFR BLD AUTO: 0.5 % — SIGNIFICANT CHANGE UP (ref 0–0.9)
INR BLD: 0.95 RATIO — SIGNIFICANT CHANGE UP (ref 0.85–1.16)
LIDOCAIN IGE QN: 20 U/L — SIGNIFICANT CHANGE UP (ref 13–75)
LYMPHOCYTES # BLD AUTO: 0.39 K/UL — LOW (ref 1–3.3)
LYMPHOCYTES # BLD AUTO: 4.4 % — LOW (ref 13–44)
MAGNESIUM SERPL-MCNC: 2 MG/DL — SIGNIFICANT CHANGE UP (ref 1.6–2.6)
MCHC RBC-ENTMCNC: 28.7 PG — SIGNIFICANT CHANGE UP (ref 27–34)
MCHC RBC-ENTMCNC: 33.3 GM/DL — SIGNIFICANT CHANGE UP (ref 32–36)
MCV RBC AUTO: 86.2 FL — SIGNIFICANT CHANGE UP (ref 80–100)
MONOCYTES # BLD AUTO: 0.2 K/UL — SIGNIFICANT CHANGE UP (ref 0–0.9)
MONOCYTES NFR BLD AUTO: 2.3 % — SIGNIFICANT CHANGE UP (ref 2–14)
NEUTROPHILS # BLD AUTO: 8.13 K/UL — HIGH (ref 1.8–7.4)
NEUTROPHILS NFR BLD AUTO: 92.6 % — HIGH (ref 43–77)
NRBC # BLD: 0 /100 WBCS — SIGNIFICANT CHANGE UP (ref 0–0)
NT-PROBNP SERPL-SCNC: 50 PG/ML — SIGNIFICANT CHANGE UP (ref 0–450)
PLATELET # BLD AUTO: 223 K/UL — SIGNIFICANT CHANGE UP (ref 150–400)
POTASSIUM SERPL-MCNC: 4.1 MMOL/L — SIGNIFICANT CHANGE UP (ref 3.5–5.3)
POTASSIUM SERPL-SCNC: 4.1 MMOL/L — SIGNIFICANT CHANGE UP (ref 3.5–5.3)
PROT SERPL-MCNC: 7.8 G/DL — SIGNIFICANT CHANGE UP (ref 6–8.3)
PROTHROM AB SERPL-ACNC: 11.2 SEC — SIGNIFICANT CHANGE UP (ref 9.9–13.4)
RBC # BLD: 4.64 M/UL — SIGNIFICANT CHANGE UP (ref 3.8–5.2)
RBC # FLD: 12.7 % — SIGNIFICANT CHANGE UP (ref 10.3–14.5)
SODIUM SERPL-SCNC: 136 MMOL/L — SIGNIFICANT CHANGE UP (ref 135–145)
TROPONIN I, HIGH SENSITIVITY RESULT: <3 NG/L — SIGNIFICANT CHANGE UP
WBC # BLD: 8.78 K/UL — SIGNIFICANT CHANGE UP (ref 3.8–10.5)
WBC # FLD AUTO: 8.78 K/UL — SIGNIFICANT CHANGE UP (ref 3.8–10.5)

## 2024-10-08 PROCEDURE — 99285 EMERGENCY DEPT VISIT HI MDM: CPT

## 2024-10-08 PROCEDURE — 93010 ELECTROCARDIOGRAM REPORT: CPT

## 2024-10-08 PROCEDURE — 71045 X-RAY EXAM CHEST 1 VIEW: CPT | Mod: 26

## 2024-10-08 RX ORDER — DIPHENHYDRAMINE HCL 12.5MG/5ML
50 LIQUID (ML) ORAL ONCE
Refills: 0 | Status: COMPLETED | OUTPATIENT
Start: 2024-10-08 | End: 2024-10-08

## 2024-10-08 RX ORDER — DIPHENHYDRAMINE HCL 12.5MG/5ML
25 LIQUID (ML) ORAL ONCE
Refills: 0 | Status: COMPLETED | OUTPATIENT
Start: 2024-10-08 | End: 2024-10-08

## 2024-10-08 RX ORDER — IPRATROPIUM BROMIDE AND ALBUTEROL SULFATE .5; 3 MG/3ML; MG/3ML
3 SOLUTION RESPIRATORY (INHALATION) ONCE
Refills: 0 | Status: COMPLETED | OUTPATIENT
Start: 2024-10-08 | End: 2024-10-08

## 2024-10-08 RX ORDER — METHYLPREDNISOLONE ACETATE 80 MG/ML
125 INJECTION, SUSPENSION INTRA-ARTICULAR; INTRALESIONAL; INTRAMUSCULAR; SOFT TISSUE ONCE
Refills: 0 | Status: COMPLETED | OUTPATIENT
Start: 2024-10-08 | End: 2024-10-08

## 2024-10-08 RX ORDER — SODIUM CHLORIDE 0.9 % (FLUSH) 0.9 %
1000 SYRINGE (ML) INJECTION ONCE
Refills: 0 | Status: COMPLETED | OUTPATIENT
Start: 2024-10-08 | End: 2024-10-08

## 2024-10-08 RX ADMIN — METHYLPREDNISOLONE ACETATE 125 MILLIGRAM(S): 80 INJECTION, SUSPENSION INTRA-ARTICULAR; INTRALESIONAL; INTRAMUSCULAR; SOFT TISSUE at 19:46

## 2024-10-08 RX ADMIN — IPRATROPIUM BROMIDE AND ALBUTEROL SULFATE 3 MILLILITER(S): .5; 3 SOLUTION RESPIRATORY (INHALATION) at 19:47

## 2024-10-08 RX ADMIN — Medication 25 MILLIGRAM(S): at 22:48

## 2024-10-08 RX ADMIN — Medication 1000 MILLILITER(S): at 21:05

## 2024-10-08 RX ADMIN — Medication 50 MILLIGRAM(S): at 19:46

## 2024-10-08 NOTE — ED PROVIDER NOTE - PATIENT PORTAL LINK FT
You can access the FollowMyHealth Patient Portal offered by [facility] by registering at the following website: [email or web address]. By joining [facility]’s FollowMyHealth portal, you will also be able to view your health information using other applications (apps) compatible with our system.

## 2024-10-08 NOTE — ED PROVIDER NOTE - OBJECTIVE STATEMENT
78F with PMh of asthma, HTN, HLD, fibromyalgia and chronic pain who presents with chest pain, shortness of breath and fatigue. patient was diagnosed with COVID recently, has been taking paxlovid, today is the last day of medications for her. she has been very tired, slight sob and chest pain but not serious. today after taking the morning dose of paxlovid, started to have severe mid sternal chest pain, into right arm, abdomen with nausea and pain of both feet. she reports never having pain like this before. denies h/o cardiac disease. states she has been at home not moving much this past week due to her fatigue

## 2024-10-08 NOTE — ED PROVIDER NOTE - NSFOLLOWUPINSTRUCTIONS_ED_ALL_ED_FT
Please return to the Emergency Department immediately for worsening chest pain, shortness of breath, vomiting and if you have any other problems or concerns. Please follow up with your Primary Care Physician within the next 2 days.    Please take any prescription medications prescribed as instructed    Please follow up with cardiologist within the next 2 days as well

## 2024-10-08 NOTE — ED ADULT NURSE NOTE - JUGULAR VENOUS DISTENTION
Patient comes to clinic for follow up anticoagulation visit.   Last INR 12/26/17 was 1.7.  Patient was instructed to take 3.75mg that night and then was instructed to take 2.5mg daily.  Today's INR is 3.8 and is above goal range.  Patient denies any changes that may have impacted his INR.   Current warfarin dosing verified with patient.  Patient was informed that their INR result was above therapeutic range and instructed to decrease current dose back to 15mg TWD. Dr. Hanna is in the office today supervising the treatment. Note forwarded to physician for review.    Patient declines review of signs and symptoms of bleeding. Reviewed and provided dosing chart to patient who verbalized understanding of new regimen.  Encouraged call to clinic with any questions or concerns.      Patient was instructed to contact the [facility] with any unusual bleeding or bruising, any changes in medications or over the counter medications, start of antibiotics, changes in diet or health status, any acute illnesses, and if there are any other questions or concerns. Patient verbalized understanding of above.       absent

## 2024-10-08 NOTE — ED ADULT TRIAGE NOTE - CHIEF COMPLAINT QUOTE
paul (ncpd)  tested positive for Covid one week ago, has taken Paxlovid (last dose was supposed to be tonight).  I felt like I had some issues last night but was resting and using my inhaler and I managed.  Today, I called my doctor and he did not like the way I sounded and recommended er.  alert / oriented x4.

## 2024-10-08 NOTE — ED ADULT TRIAGE NOTE - GLASGOW COMA SCALE: BEST VERBAL RESPONSE, MLM
(V5) oriented - Patient p/w diarrhea since 3 days   - no fever , NO WBCs , no abdominal tenderness   - stool studies wnl  - GI PCR - No growth.   - Resolved.

## 2024-10-08 NOTE — ED PROVIDER NOTE - CLINICAL SUMMARY MEDICAL DECISION MAKING FREE TEXT BOX
Patient is a 78y old  Female who presents with a chief complaint of chest pain and shortness of breath. patient has had mild symptoms while having COVID, but earlier tdoay after taking the last morning dose of paxlovid her chest pain and sob acute worsened. currently has no pain    PE: Patient is well appearing, no acute distress, no signs of head trauma, lungs clear bilaterally, heart rate is tachycardic, abdomen is soft and nontender to palpation without guarding or rebound, normal pulses in all extremities,     labs, imaging, oxygen as needed, cardiac monitoring, medications. Will assess for PE vs PNA. Patient is a 78y old  Female who presents with a chief complaint of chest pain and shortness of breath. patient has had mild symptoms while having COVID, but earlier tdoay after taking the last morning dose of paxlovid her chest pain and sob acute worsened. currently has no pain    PE: Patient is well appearing, no acute distress, no signs of head trauma, lungs clear bilaterally, heart rate is tachycardic, abdomen is soft and nontender to palpation without guarding or rebound, normal pulses in all extremities,     labs, imaging, oxygen as needed, cardiac monitoring, medications. Will assess for PE vs PNA.    Patient was re-evaluated at this time and they are feeling much better. her troponin negative x2, her CTA/CT shows no acute findings, negative for PE and PNA. patient no longer tachcyardic, her symptoms likely secondary to COVID infection. The Patient will be discharged home at this time. Their lab and/or imaging results were explained with the patient and they were instructed to go over them with their PCP. All questions were answered at this time.    Patient was told to follow up with their PCP within the next 2 days. they were told to return to the ED if they have worsening pain, hypoxia, vomiting. also told to follow up with her cardiologist    patient will be discharged home at this time, they are in agreement with the plan

## 2024-10-08 NOTE — ED PROVIDER NOTE - PHYSICAL EXAMINATION
Gen: Awake, Alert, NAD  Head:  NC/AT  Eyes:  PERRL, EOMI, Conjunctiva pink, no scleral icterus  ENT:  no exudates, no erythema, uvula midline, TMs clear bilaterally, dry mucus membranes  Neck: supple, nontender, no meningismus, no JVD, trachea midline  Cardiac/CV:  S1 S2, tachycardic, no murmurs  Respiratory/Pulm:  CTAB, good air movement, normal resp effort, no wheezes/stridor/retractions/rales/rhonchi  Gastrointestinal/Abdomen:  Soft, nontender, nondistended, no rebound/guarding  Ext:  warm, well perfused, moving all extremities spontaneously, no cyanosis, no erythema, no edema, distal pulses intact  Skin: intact, no rash, no petechiae, no ecchymosis  Neuro:  AAOx3, sensation intact, motor 5/5 x 4 extremities, clear speech

## 2024-10-08 NOTE — ED ADULT NURSE NOTE - OBJECTIVE STATEMENT
Patient received complaining of SOB, states tested positive for covid a week ago, recently about to finish a regiment of paxlovid when she woke up this morning feeling SOB, CP, abd. pain, right arm pain and bilateral leg pain. State h/x of asthma, Patient is AOx4, safety precautions in place, awaiting evaluation

## 2024-10-09 VITALS
DIASTOLIC BLOOD PRESSURE: 54 MMHG | SYSTOLIC BLOOD PRESSURE: 125 MMHG | TEMPERATURE: 98 F | RESPIRATION RATE: 20 BRPM | HEART RATE: 90 BPM | OXYGEN SATURATION: 96 %

## 2024-10-09 LAB — TROPONIN I, HIGH SENSITIVITY RESULT: 3.5 NG/L — SIGNIFICANT CHANGE UP

## 2024-10-09 PROCEDURE — 74177 CT ABD & PELVIS W/CONTRAST: CPT | Mod: 26,MC

## 2024-10-09 PROCEDURE — 71275 CT ANGIOGRAPHY CHEST: CPT | Mod: 26,MC

## 2024-10-09 NOTE — ED ADULT NURSE REASSESSMENT NOTE - ANCILLARY STATUS
[FreeTextEntry8] : Presents on acute basis complaining of increased wheezing over the past week - states has used Proventil successfully in the past.\par Also reviewed current issues - states trying to find another Psychiatrist for ongoing anxiety issues and has requested that this office handle her medication until then; also states having increased pain due to known endometriosis - following with surgeon and plans surgical removal of implants - states current medication not effective in maintaining daily functioning.  Using medication appropriately with no evidence of adverse effects.
lab results pending

## 2024-10-09 NOTE — ED ADULT NURSE REASSESSMENT NOTE - NSFALLHARMRISKINTERV_ED_ALL_ED

## 2024-10-09 NOTE — ED PROVIDER NOTE - EXTREMITY EXAM
Diagnosis:   1. 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    Vital Signs:  ONC OP Encounter Vitals  BP: 138/78  Heart Rate: (!) 58  Temp: 97.3 °F (36.3 °C)  Temp src: Temporal  SpO2: 94 %  Weight: 86.6 kg (191 lb)  Height: 5' 1\" (1.549 m)  Pain Score:  0  BSA (Calculated - m2) - Kami & Kami: 1.85  BSA (Calculated - sq m): 1.93  BMI (Calculated): 36.09      Allergies:  ALLERGIES:  No Known Allergies     Medications:  The medication list was reviewed. No changes noted.     ECOG: ECOG Performance Status: 0    Distress Screening: Is this day one of cycle or a new regimen? No No, patient did not indicate any new concerns. Refer to most recent PHQ2/9 score    Toxicity Assessment:   Adverse Events?  Adverse Events: No    Auditory/Ear  Assessment: Yes (Within Defined Limits)    Cardiac General  Assessment: Yes (Within Defined Limits)  Hypertension: Grade 1    Dermatology/Skin  Assessment: Yes (Within Defined Limits)    Constitutional  Assessment: Yes (Within Defined Limits)  Fatigue: Grade 1    Endocrine  Assessment: Yes (Within Defined Limits)    Gastrointestinal  Assessment: Yes (Within Defined Limits)    Hemorrhage/Bleeding  Assessment: Yes (Within Defined Limits)    Infection  Assessment: Yes (Within Defined Limits)    Lymphatics  Assessment: Yes (Within Defined Limits)    Musculoskeletal  Assessment: Yes (Within Defined Limits)    Neurology  Assessment: Yes (Within Defined Limits)    Ocular  Assessment: Yes (Within Defined Limits)    Pain  Assessment: Yes (Within Defined Limits)    Pulmonary/Upper Respiratory  Assessment: Yes (Within Defined Limits)    Genitourinary  Assessment: Yes (Within Defined Limits)      Additional Nursing Assessment: In addition to above toxicity assessment, this RN assessed n/a .       Prechemo Checklist  Chemo Consent Signed: Yes  Protocol Verified: Yes  Is  Protocol Standard of Care or Research?: Standard of Care  Pre-Chemo Labs Reviewed?: Yes  Provider Notified of Abnormal Labs Not Meeting Treatment Conditions: N/A  Pregnancy Screening Performed (if indicated): Yes  All Treatment Conditions Met?: Yes  BSA/weight in Orders Verified for Weight-Based Drugs?: N/A  Chemo Dose Calculations Verified: Yes  Second RN Verified Calculations: Ching GILLIAM RN      Pre-Treatment: Patient has valid pre-authorization, Premed orders, including hydration, are verified prior to administration, and I have reviewed the following with the patient: Name of chemo drug, duration and route of infusion, Infusion/Drug volume and dose, and reportable infusion-related symptoms.     Treatment: Refer to [facility] and MAR for line assessment and medication administration, Chemotherapy has not ; double checked & verified by two practitioners, Appearance and physical integrity of drugs meets standard of drug monograph; double checked & verified by two practitioners, Rate set on infusion pump is in alignment with ordered rate; double checked & verified by two practitioners, Drugs were administered in proper sequencing, Blood return confirmed before, during and after treatment administered, and Infusion pump used for non-vesicant drugs    Post Treatment: Treatment tolerated well; no adverse reaction    Oral Chemotherapy: No.    Education: No new instructions needed    Next appointment scheduled: 24 Patient will get her next treatment in 5 weeks after she comes back from vacation  Patient instructed to call the office with any questions or concerns.    Patient Discharged: patient discharged to home per self, ambulatory   left upper extremity findings/right upper extremity findings/left lower extremity findings/right lower extremity findings

## 2024-10-09 NOTE — ED ADULT NURSE REASSESSMENT NOTE - NS ED NURSE REASSESS COMMENT FT1
report received from Yaa LUKE. pt A&O4 family at bedside. pt denies any pain or SOB at this time. No signs of distress noted. Plan of care continued .

## 2024-11-20 PROCEDURE — 83880 ASSAY OF NATRIURETIC PEPTIDE: CPT

## 2024-11-20 PROCEDURE — 94640 AIRWAY INHALATION TREATMENT: CPT

## 2024-11-20 PROCEDURE — 85025 COMPLETE CBC W/AUTO DIFF WBC: CPT

## 2024-11-20 PROCEDURE — 83690 ASSAY OF LIPASE: CPT

## 2024-11-20 PROCEDURE — 96375 TX/PRO/DX INJ NEW DRUG ADDON: CPT | Mod: XU

## 2024-11-20 PROCEDURE — 36415 COLL VENOUS BLD VENIPUNCTURE: CPT

## 2024-11-20 PROCEDURE — 96374 THER/PROPH/DIAG INJ IV PUSH: CPT | Mod: XU

## 2024-11-20 PROCEDURE — 71045 X-RAY EXAM CHEST 1 VIEW: CPT

## 2024-11-20 PROCEDURE — 85730 THROMBOPLASTIN TIME PARTIAL: CPT

## 2024-11-20 PROCEDURE — 84484 ASSAY OF TROPONIN QUANT: CPT

## 2024-11-20 PROCEDURE — 99285 EMERGENCY DEPT VISIT HI MDM: CPT | Mod: 25

## 2024-11-20 PROCEDURE — 80053 COMPREHEN METABOLIC PANEL: CPT

## 2024-11-20 PROCEDURE — 85610 PROTHROMBIN TIME: CPT

## 2024-11-20 PROCEDURE — 71275 CT ANGIOGRAPHY CHEST: CPT | Mod: MC

## 2024-11-20 PROCEDURE — 83735 ASSAY OF MAGNESIUM: CPT

## 2024-11-20 PROCEDURE — 93005 ELECTROCARDIOGRAM TRACING: CPT

## 2024-11-20 PROCEDURE — 96376 TX/PRO/DX INJ SAME DRUG ADON: CPT | Mod: XU

## 2024-11-20 PROCEDURE — 74177 CT ABD & PELVIS W/CONTRAST: CPT | Mod: MC

## 2025-01-21 ENCOUNTER — NON-APPOINTMENT (OUTPATIENT)
Age: 80
End: 2025-01-21

## 2025-01-21 ENCOUNTER — APPOINTMENT (OUTPATIENT)
Dept: UROGYNECOLOGY | Facility: CLINIC | Age: 80
End: 2025-01-21
Payer: MEDICARE

## 2025-01-21 VITALS
DIASTOLIC BLOOD PRESSURE: 90 MMHG | HEART RATE: 97 BPM | BODY MASS INDEX: 30.05 KG/M2 | SYSTOLIC BLOOD PRESSURE: 150 MMHG | HEIGHT: 64 IN | WEIGHT: 176 LBS

## 2025-01-21 DIAGNOSIS — R39.15 URGENCY OF URINATION: ICD-10-CM

## 2025-01-21 DIAGNOSIS — N81.11 CYSTOCELE, MIDLINE: ICD-10-CM

## 2025-01-21 DIAGNOSIS — N39.46 MIXED INCONTINENCE: ICD-10-CM

## 2025-01-21 DIAGNOSIS — N81.6 RECTOCELE: ICD-10-CM

## 2025-01-21 LAB
BILIRUB UR QL STRIP: NORMAL
CLARITY UR: CLEAR
COLLECTION METHOD: NORMAL
GLUCOSE UR-MCNC: NORMAL
HCG UR QL: 0.2 EU/DL
HGB UR QL STRIP.AUTO: NORMAL
KETONES UR-MCNC: NORMAL
LEUKOCYTE ESTERASE UR QL STRIP: NORMAL
NITRITE UR QL STRIP: NORMAL
PH UR STRIP: 5.5
PROT UR STRIP-MCNC: NORMAL
SP GR UR STRIP: 1.03

## 2025-01-21 PROCEDURE — 99459 PELVIC EXAMINATION: CPT

## 2025-01-21 PROCEDURE — 99204 OFFICE O/P NEW MOD 45 MIN: CPT | Mod: 25

## 2025-01-21 PROCEDURE — 51701 INSERT BLADDER CATHETER: CPT

## 2025-01-22 DIAGNOSIS — Z83.49 FAMILY HISTORY OF OTHER ENDOCRINE, NUTRITIONAL AND METABOLIC DISEASES: ICD-10-CM

## 2025-01-22 DIAGNOSIS — Z87.09 PERSONAL HISTORY OF OTHER DISEASES OF THE RESPIRATORY SYSTEM: ICD-10-CM

## 2025-01-22 DIAGNOSIS — Z87.19 PERSONAL HISTORY OF OTHER DISEASES OF THE DIGESTIVE SYSTEM: ICD-10-CM

## 2025-01-22 DIAGNOSIS — Z87.828 PERSONAL HISTORY OF OTHER (HEALED) PHYSICAL INJURY AND TRAUMA: ICD-10-CM

## 2025-01-22 DIAGNOSIS — Z82.49 FAMILY HISTORY OF ISCHEMIC HEART DISEASE AND OTHER DISEASES OF THE CIRCULATORY SYSTEM: ICD-10-CM

## 2025-01-22 DIAGNOSIS — Z86.19 PERSONAL HISTORY OF OTHER INFECTIOUS AND PARASITIC DISEASES: ICD-10-CM

## 2025-01-22 DIAGNOSIS — Z80.3 FAMILY HISTORY OF MALIGNANT NEOPLASM OF BREAST: ICD-10-CM

## 2025-01-22 DIAGNOSIS — Z80.0 FAMILY HISTORY OF MALIGNANT NEOPLASM OF DIGESTIVE ORGANS: ICD-10-CM

## 2025-01-22 DIAGNOSIS — K46.9 UNSPECIFIED ABDOMINAL HERNIA W/OUT OBSTRUCTION OR GANGRENE: ICD-10-CM

## 2025-01-22 DIAGNOSIS — Z87.01 PERSONAL HISTORY OF PNEUMONIA (RECURRENT): ICD-10-CM

## 2025-01-22 DIAGNOSIS — Z83.6 FAMILY HISTORY OF OTHER DISEASES OF THE RESPIRATORY SYSTEM: ICD-10-CM

## 2025-01-22 DIAGNOSIS — Z83.438 FAMILY HISTORY OF OTHER DISORDER OF LIPOPROTEIN METABOLISM AND OTHER LIPIDEMIA: ICD-10-CM

## 2025-01-22 DIAGNOSIS — Z84.1 FAMILY HISTORY OF DISORDERS OF KIDNEY AND URETER: ICD-10-CM

## 2025-01-22 DIAGNOSIS — Z87.39 PERSONAL HISTORY OF OTHER DISEASES OF THE MUSCULOSKELETAL SYSTEM AND CONNECTIVE TISSUE: ICD-10-CM

## 2025-01-22 DIAGNOSIS — Z81.8 FAMILY HISTORY OF OTHER MENTAL AND BEHAVIORAL DISORDERS: ICD-10-CM

## 2025-01-22 DIAGNOSIS — Z86.39 PERSONAL HISTORY OF OTHER ENDOCRINE, NUTRITIONAL AND METABOLIC DISEASE: ICD-10-CM

## 2025-01-22 DIAGNOSIS — Z83.3 FAMILY HISTORY OF DIABETES MELLITUS: ICD-10-CM

## 2025-01-22 DIAGNOSIS — Z83.518 FAMILY HISTORY OF OTHER SPECIFIED EYE DISORDER: ICD-10-CM

## 2025-01-22 DIAGNOSIS — Z86.59 PERSONAL HISTORY OF OTHER MENTAL AND BEHAVIORAL DISORDERS: ICD-10-CM

## 2025-01-22 RX ORDER — MIRABEGRON 50 MG/1
50 TABLET, EXTENDED RELEASE ORAL
Qty: 30 | Refills: 6 | Status: COMPLETED | COMMUNITY
Start: 2025-01-21 | End: 2025-01-22

## 2025-01-22 RX ORDER — MIRABEGRON 50 MG/1
50 TABLET, FILM COATED, EXTENDED RELEASE ORAL DAILY
Qty: 30 | Refills: 1 | Status: ACTIVE | COMMUNITY
Start: 2025-01-22 | End: 1900-01-01

## 2025-01-23 ENCOUNTER — NON-APPOINTMENT (OUTPATIENT)
Age: 80
End: 2025-01-23

## 2025-01-23 LAB — BACTERIA UR CULT: NORMAL

## 2025-02-10 ENCOUNTER — APPOINTMENT (OUTPATIENT)
Dept: UROGYNECOLOGY | Facility: CLINIC | Age: 80
End: 2025-02-10

## 2025-03-11 ENCOUNTER — APPOINTMENT (OUTPATIENT)
Dept: UROGYNECOLOGY | Facility: CLINIC | Age: 80
End: 2025-03-11